# Patient Record
Sex: MALE | Race: BLACK OR AFRICAN AMERICAN | NOT HISPANIC OR LATINO | Employment: FULL TIME | URBAN - METROPOLITAN AREA
[De-identification: names, ages, dates, MRNs, and addresses within clinical notes are randomized per-mention and may not be internally consistent; named-entity substitution may affect disease eponyms.]

---

## 2018-09-16 ENCOUNTER — HOSPITAL ENCOUNTER (INPATIENT)
Facility: HOSPITAL | Age: 43
LOS: 5 days | Discharge: HOME | DRG: 023 | End: 2018-09-21
Attending: EMERGENCY MEDICINE | Admitting: PSYCHIATRY & NEUROLOGY

## 2018-09-16 DIAGNOSIS — Q21.12 PFO (PATENT FORAMEN OVALE): ICD-10-CM

## 2018-09-16 DIAGNOSIS — I49.9 CARDIAC RHYTHM DISORDER OR DISTURBANCE OR CHANGE: ICD-10-CM

## 2018-09-16 DIAGNOSIS — I63.412 CEREBROVASCULAR ACCIDENT (CVA) DUE TO EMBOLISM OF LEFT MIDDLE CEREBRAL ARTERY: Primary | ICD-10-CM

## 2018-09-16 DIAGNOSIS — I63.412 CEREBRAL INFARCTION DUE TO EMBOLISM OF LEFT MIDDLE CEREBRAL ARTERY: ICD-10-CM

## 2018-09-16 DIAGNOSIS — I25.3 PFO WITH ATRIAL SEPTAL ANEURYSM: ICD-10-CM

## 2018-09-16 DIAGNOSIS — Z92.82 STATUS POST ADMINISTRATION OF TPA (RTPA) IN A DIFFERENT FACILITY WITHIN THE LAST 24 HOURS PRIOR TO ADMISSION TO CURRENT FACILITY: ICD-10-CM

## 2018-09-16 DIAGNOSIS — I63.9 STROKE: ICD-10-CM

## 2018-09-16 DIAGNOSIS — Q21.12 PFO WITH ATRIAL SEPTAL ANEURYSM: ICD-10-CM

## 2018-09-16 PROBLEM — G93.6 CYTOTOXIC CEREBRAL EDEMA: Status: ACTIVE | Noted: 2018-09-16

## 2018-09-16 PROBLEM — I10 ESSENTIAL HYPERTENSION: Status: ACTIVE | Noted: 2018-09-16

## 2018-09-16 LAB
ABO + RH BLD: NORMAL
ALBUMIN SERPL BCP-MCNC: 4 G/DL
ALP SERPL-CCNC: 85 U/L
ALT SERPL W/O P-5'-P-CCNC: 34 U/L
AMPHET+METHAMPHET UR QL: NEGATIVE
AMPHET+METHAMPHET UR QL: NEGATIVE
ANION GAP SERPL CALC-SCNC: 10 MMOL/L
AST SERPL-CCNC: 40 U/L
BARBITURATES UR QL SCN>200 NG/ML: NEGATIVE
BARBITURATES UR QL SCN>200 NG/ML: NEGATIVE
BASOPHILS # BLD AUTO: 0.04 K/UL
BASOPHILS NFR BLD: 0.3 %
BENZODIAZ UR QL SCN>200 NG/ML: NORMAL
BENZODIAZ UR QL SCN>200 NG/ML: NORMAL
BILIRUB SERPL-MCNC: 0.6 MG/DL
BLD GP AB SCN CELLS X3 SERPL QL: NORMAL
BUN SERPL-MCNC: 12 MG/DL
BZE UR QL SCN: NEGATIVE
BZE UR QL SCN: NEGATIVE
CALCIUM SERPL-MCNC: 9.6 MG/DL
CANNABINOIDS UR QL SCN: NORMAL
CANNABINOIDS UR QL SCN: NORMAL
CHLORIDE SERPL-SCNC: 105 MMOL/L
CHOLEST SERPL-MCNC: 180 MG/DL
CHOLEST/HDLC SERPL: 3.2 {RATIO}
CK MB SERPL-MCNC: 2.2 NG/ML
CK MB SERPL-RTO: 0.4 %
CK SERPL-CCNC: 591 U/L
CO2 SERPL-SCNC: 25 MMOL/L
CREAT SERPL-MCNC: 1.3 MG/DL
CREAT UR-MCNC: 121 MG/DL
CREAT UR-MCNC: 121 MG/DL
DIASTOLIC DYSFUNCTION: NO
DIFFERENTIAL METHOD: ABNORMAL
EOSINOPHIL # BLD AUTO: 0 K/UL
EOSINOPHIL NFR BLD: 0.3 %
ERYTHROCYTE [DISTWIDTH] IN BLOOD BY AUTOMATED COUNT: 15 %
ERYTHROCYTE [SEDIMENTATION RATE] IN BLOOD BY WESTERGREN METHOD: NORMAL MM/HR
EST. GFR  (AFRICAN AMERICAN): >60 ML/MIN/1.73 M^2
EST. GFR  (NON AFRICAN AMERICAN): >60 ML/MIN/1.73 M^2
ESTIMATED AVG GLUCOSE: 100 MG/DL
ESTIMATED PA SYSTOLIC PRESSURE: 23.43
ETHANOL UR-MCNC: <10 MG/DL
GLUCOSE SERPL-MCNC: 102 MG/DL
HBA1C MFR BLD HPLC: 5.1 %
HCT VFR BLD AUTO: 44.8 %
HDLC SERPL-MCNC: 57 MG/DL
HDLC SERPL: 31.7 %
HGB BLD-MCNC: 14.5 G/DL
IMM GRANULOCYTES # BLD AUTO: 0.07 K/UL
IMM GRANULOCYTES NFR BLD AUTO: 0.5 %
INR PPP: 1.1
LDLC SERPL CALC-MCNC: 107.2 MG/DL
LYMPHOCYTES # BLD AUTO: 1.8 K/UL
LYMPHOCYTES NFR BLD: 13.1 %
MAGNESIUM SERPL-MCNC: 2.3 MG/DL
MCH RBC QN AUTO: 30.9 PG
MCHC RBC AUTO-ENTMCNC: 32.4 G/DL
MCV RBC AUTO: 95 FL
METHADONE UR QL SCN>300 NG/ML: NEGATIVE
METHADONE UR QL SCN>300 NG/ML: NEGATIVE
MONOCYTES # BLD AUTO: 0.7 K/UL
MONOCYTES NFR BLD: 4.9 %
NEUTROPHILS # BLD AUTO: 11.2 K/UL
NEUTROPHILS NFR BLD: 80.9 %
NONHDLC SERPL-MCNC: 123 MG/DL
NRBC BLD-RTO: 0 /100 WBC
OPIATES UR QL SCN: NEGATIVE
OPIATES UR QL SCN: NEGATIVE
PCP UR QL SCN>25 NG/ML: NEGATIVE
PCP UR QL SCN>25 NG/ML: NEGATIVE
PHOSPHATE SERPL-MCNC: 2.6 MG/DL
PLATELET # BLD AUTO: 339 K/UL
PMV BLD AUTO: 10.3 FL
POCT GLUCOSE: 116 MG/DL (ref 70–110)
POCT GLUCOSE: 163 MG/DL (ref 70–110)
POTASSIUM SERPL-SCNC: 4.3 MMOL/L
PROT SERPL-MCNC: 7.6 G/DL
PROTHROMBIN TIME: 11.2 SEC
RBC # BLD AUTO: 4.7 M/UL
RETIRED EF AND QEF - SEE NOTES: 60 (ref 55–65)
SODIUM SERPL-SCNC: 140 MMOL/L
TOXICOLOGY INFORMATION: NORMAL
TOXICOLOGY INFORMATION: NORMAL
TRIGL SERPL-MCNC: 79 MG/DL
TROPONIN I SERPL DL<=0.01 NG/ML-MCNC: <0.006 NG/ML
TROPONIN I SERPL DL<=0.01 NG/ML-MCNC: <0.006 NG/ML
TSH SERPL DL<=0.005 MIU/L-ACNC: 1.47 UIU/ML
WBC # BLD AUTO: 13.82 K/UL

## 2018-09-16 PROCEDURE — 96375 TX/PRO/DX INJ NEW DRUG ADDON: CPT

## 2018-09-16 PROCEDURE — 80307 DRUG TEST PRSMV CHEM ANLYZR: CPT

## 2018-09-16 PROCEDURE — 96376 TX/PRO/DX INJ SAME DRUG ADON: CPT

## 2018-09-16 PROCEDURE — 97162 PT EVAL MOD COMPLEX 30 MIN: CPT

## 2018-09-16 PROCEDURE — 85610 PROTHROMBIN TIME: CPT

## 2018-09-16 PROCEDURE — 84100 ASSAY OF PHOSPHORUS: CPT

## 2018-09-16 PROCEDURE — B3171ZZ FLUOROSCOPY OF LEFT INTERNAL CAROTID ARTERY USING LOW OSMOLAR CONTRAST: ICD-10-PCS | Performed by: PSYCHIATRY & NEUROLOGY

## 2018-09-16 PROCEDURE — 84443 ASSAY THYROID STIM HORMONE: CPT

## 2018-09-16 PROCEDURE — 83735 ASSAY OF MAGNESIUM: CPT

## 2018-09-16 PROCEDURE — 51701 INSERT BLADDER CATHETER: CPT

## 2018-09-16 PROCEDURE — 82550 ASSAY OF CK (CPK): CPT

## 2018-09-16 PROCEDURE — 20000000 HC ICU ROOM

## 2018-09-16 PROCEDURE — 93306 TTE W/DOPPLER COMPLETE: CPT

## 2018-09-16 PROCEDURE — 84484 ASSAY OF TROPONIN QUANT: CPT | Mod: 91

## 2018-09-16 PROCEDURE — 85025 COMPLETE CBC W/AUTO DIFF WBC: CPT

## 2018-09-16 PROCEDURE — 84484 ASSAY OF TROPONIN QUANT: CPT

## 2018-09-16 PROCEDURE — 93010 ELECTROCARDIOGRAM REPORT: CPT | Mod: ,,, | Performed by: INTERNAL MEDICINE

## 2018-09-16 PROCEDURE — 97530 THERAPEUTIC ACTIVITIES: CPT

## 2018-09-16 PROCEDURE — 92610 EVALUATE SWALLOWING FUNCTION: CPT

## 2018-09-16 PROCEDURE — 94761 N-INVAS EAR/PLS OXIMETRY MLT: CPT

## 2018-09-16 PROCEDURE — G8997 SWALLOW GOAL STATUS: HCPCS | Mod: CL

## 2018-09-16 PROCEDURE — 25000003 PHARM REV CODE 250: Performed by: NURSE PRACTITIONER

## 2018-09-16 PROCEDURE — 99223 1ST HOSP IP/OBS HIGH 75: CPT | Mod: ,,, | Performed by: NURSE PRACTITIONER

## 2018-09-16 PROCEDURE — G8996 SWALLOW CURRENT STATUS: HCPCS | Mod: CN

## 2018-09-16 PROCEDURE — G8978 MOBILITY CURRENT STATUS: HCPCS | Mod: CI

## 2018-09-16 PROCEDURE — 83036 HEMOGLOBIN GLYCOSYLATED A1C: CPT

## 2018-09-16 PROCEDURE — 93306 TTE W/DOPPLER COMPLETE: CPT | Mod: 26,,, | Performed by: INTERNAL MEDICINE

## 2018-09-16 PROCEDURE — 82553 CREATINE MB FRACTION: CPT

## 2018-09-16 PROCEDURE — 03CG3ZZ EXTIRPATION OF MATTER FROM INTRACRANIAL ARTERY, PERCUTANEOUS APPROACH: ICD-10-PCS | Performed by: PSYCHIATRY & NEUROLOGY

## 2018-09-16 PROCEDURE — 99285 EMERGENCY DEPT VISIT HI MDM: CPT | Mod: 25

## 2018-09-16 PROCEDURE — 63600175 PHARM REV CODE 636 W HCPCS: Performed by: PHYSICIAN ASSISTANT

## 2018-09-16 PROCEDURE — 80053 COMPREHEN METABOLIC PANEL: CPT

## 2018-09-16 PROCEDURE — 86850 RBC ANTIBODY SCREEN: CPT

## 2018-09-16 PROCEDURE — 96374 THER/PROPH/DIAG INJ IV PUSH: CPT

## 2018-09-16 PROCEDURE — 85652 RBC SED RATE AUTOMATED: CPT

## 2018-09-16 PROCEDURE — 63600175 PHARM REV CODE 636 W HCPCS: Performed by: PSYCHIATRY & NEUROLOGY

## 2018-09-16 PROCEDURE — 99285 EMERGENCY DEPT VISIT HI MDM: CPT | Mod: ,,, | Performed by: PHYSICIAN ASSISTANT

## 2018-09-16 PROCEDURE — B3141ZZ FLUOROSCOPY OF LEFT COMMON CAROTID ARTERY USING LOW OSMOLAR CONTRAST: ICD-10-PCS | Performed by: PSYCHIATRY & NEUROLOGY

## 2018-09-16 PROCEDURE — G8979 MOBILITY GOAL STATUS: HCPCS | Mod: CH

## 2018-09-16 PROCEDURE — 97165 OT EVAL LOW COMPLEX 30 MIN: CPT

## 2018-09-16 PROCEDURE — 80061 LIPID PANEL: CPT

## 2018-09-16 RX ORDER — SODIUM CHLORIDE 9 MG/ML
INJECTION, SOLUTION INTRAVENOUS CONTINUOUS
Status: DISCONTINUED | OUTPATIENT
Start: 2018-09-16 | End: 2018-09-18

## 2018-09-16 RX ORDER — LISINOPRIL 10 MG/1
10 TABLET ORAL DAILY
Status: DISCONTINUED | OUTPATIENT
Start: 2018-09-16 | End: 2018-09-21 | Stop reason: HOSPADM

## 2018-09-16 RX ORDER — ONDANSETRON 2 MG/ML
INJECTION INTRAMUSCULAR; INTRAVENOUS CODE/TRAUMA/SEDATION MEDICATION
Status: COMPLETED | OUTPATIENT
Start: 2018-09-16 | End: 2018-09-16

## 2018-09-16 RX ORDER — ONDANSETRON 2 MG/ML
4 INJECTION INTRAMUSCULAR; INTRAVENOUS EVERY 6 HOURS PRN
Status: DISCONTINUED | OUTPATIENT
Start: 2018-09-16 | End: 2018-09-21 | Stop reason: HOSPADM

## 2018-09-16 RX ORDER — ATORVASTATIN CALCIUM 20 MG/1
40 TABLET, FILM COATED ORAL DAILY
Status: DISCONTINUED | OUTPATIENT
Start: 2018-09-16 | End: 2018-09-20

## 2018-09-16 RX ORDER — NICARDIPINE HYDROCHLORIDE 0.2 MG/ML
1 INJECTION INTRAVENOUS CONTINUOUS
Status: DISCONTINUED | OUTPATIENT
Start: 2018-09-16 | End: 2018-09-17

## 2018-09-16 RX ORDER — MIDAZOLAM HYDROCHLORIDE 1 MG/ML
INJECTION INTRAMUSCULAR; INTRAVENOUS CODE/TRAUMA/SEDATION MEDICATION
Status: COMPLETED | OUTPATIENT
Start: 2018-09-16 | End: 2018-09-16

## 2018-09-16 RX ORDER — LABETALOL HYDROCHLORIDE 5 MG/ML
10 INJECTION, SOLUTION INTRAVENOUS EVERY 4 HOURS PRN
Status: DISCONTINUED | OUTPATIENT
Start: 2018-09-16 | End: 2018-09-17

## 2018-09-16 RX ORDER — SODIUM CHLORIDE 0.9 % (FLUSH) 0.9 %
3 SYRINGE (ML) INJECTION EVERY 8 HOURS
Status: DISCONTINUED | OUTPATIENT
Start: 2018-09-16 | End: 2018-09-21 | Stop reason: HOSPADM

## 2018-09-16 RX ORDER — SODIUM CHLORIDE 0.9 % (FLUSH) 0.9 %
5 SYRINGE (ML) INJECTION
Status: DISCONTINUED | OUTPATIENT
Start: 2018-09-16 | End: 2018-09-21 | Stop reason: HOSPADM

## 2018-09-16 RX ORDER — ACETAMINOPHEN 650 MG/1
650 SUPPOSITORY RECTAL EVERY 6 HOURS PRN
Status: DISCONTINUED | OUTPATIENT
Start: 2018-09-16 | End: 2018-09-17

## 2018-09-16 RX ORDER — ONDANSETRON 2 MG/ML
4 INJECTION INTRAMUSCULAR; INTRAVENOUS
Status: COMPLETED | OUTPATIENT
Start: 2018-09-16 | End: 2018-09-16

## 2018-09-16 RX ADMIN — ONDANSETRON 4 MG: 2 INJECTION INTRAMUSCULAR; INTRAVENOUS at 04:09

## 2018-09-16 RX ADMIN — LABETALOL HYDROCHLORIDE 10 MG: 5 INJECTION, SOLUTION INTRAVENOUS at 03:09

## 2018-09-16 RX ADMIN — SODIUM CHLORIDE: 0.9 INJECTION, SOLUTION INTRAVENOUS at 06:09

## 2018-09-16 RX ADMIN — MIDAZOLAM HYDROCHLORIDE 1 MG: 1 INJECTION, SOLUTION INTRAMUSCULAR; INTRAVENOUS at 04:09

## 2018-09-16 RX ADMIN — NICARDIPINE HYDROCHLORIDE 2.5 MG/HR: 0.2 INJECTION, SOLUTION INTRAVENOUS at 05:09

## 2018-09-16 RX ADMIN — NICARDIPINE HYDROCHLORIDE 5.5 MG/HR: 0.2 INJECTION, SOLUTION INTRAVENOUS at 03:09

## 2018-09-16 NOTE — HOSPITAL COURSE
06/16: Admit to [facility], Ir angio/Thrombectomy, LMI TICI3  Exam with significant improvement, aphasia and R drift resolved.   MRI today with L BG acute infarcts as well as small regions of temporal and parietal lobe infarcts on left, embolic likely etiology. Echo results without abnormality. No hemorrhagic convervsion. ASA started and will ttf today.

## 2018-09-16 NOTE — ASSESSMENT & PLAN NOTE
-Area of cytotoxic cerebral edema identified when reviewing brain imaging in the territory of the left middle cerebral artery. There is no mass effect associated with it. We will continue to monitor the patients clinical exam for any worsening of symptoms which may indicate expansion of the stroke or the area of the edema resulting in the clinical change. The pattern is suggestive of embolic etiology

## 2018-09-16 NOTE — ASSESSMENT & PLAN NOTE
-Stroke risk factor.  Patient without previous diagnosis or treatment.  Initial SBP at .  SBP on arrival to this facility 170s.  -SBP<140

## 2018-09-16 NOTE — ASSESSMENT & PLAN NOTE
-- Continue to monitor HR and BP   -- SBP goal < 140  -- 2D echo pending  -- Labetalol Prn  -- Ekg pending

## 2018-09-16 NOTE — ASSESSMENT & PLAN NOTE
43 y.o. male with no significant past medical history presented to hospital as a transfer from [facility] for evaluation of L MCA stroke.  tPA administered with some improvement of symptoms but remains with weakness, aphasia, dysarthria.  Taken to IR for cerebral angiogram.      Antithrombotics for secondary stroke prevention: Antiplatelets: None: Hold all Antithrombotics x 24 hours after IV t-PA administration; Start ASA 81 mg 24 hrs post tPA administration.    Statins for secondary stroke prevention and hyperlipidemia, if present:   Statins: Atorvastatin- 40 mg daily    Aggressive risk factor modification: None     Rehab efforts: PT/OT/SLP to evaluate and treat    Diagnostics ordered/pending: HgbA1C to assess blood glucose levels, Lipid Profile to assess cholesterol levels, MRI head without contrast to assess brain parenchyma, TTE to assess cardiac function/status-Recommend bubble study due to patient's age/lack of known risk factors, TSH to assess thyroid function    VTE prophylaxis: None: Reason for No Pharmacological VTE Prophylaxis: Mechanical prophylaxis: Place SCDs, hold pharmacologic prophylaxis until 24 hrs post tPA    BP parameters: Infarct: Post sucessful thrombectomy, SBP <140

## 2018-09-16 NOTE — PROGRESS NOTES
Cerebral angiogram complete. Hemostasis achieved via right groin with use of 8 FR. Angioseal closure device at 0510 by MD Fernando. HOB to remain flat and right leg straight until 0710. No acute events. See flowsheet for further monitoring.

## 2018-09-16 NOTE — SUBJECTIVE & OBJECTIVE
No past medical history on file.  No past surgical history on file.   No current facility-administered medications on file prior to encounter.      No current outpatient medications on file prior to encounter.      Allergies: Patient has no known allergies.    No family history on file.  Social History     Tobacco Use    Smoking status: Not on file   Substance Use Topics    Alcohol use: Not on file    Drug use: Not on file     Review of Systems   Unable to assess due to expressive aphasia  Objective:     Vitals:    Pulse: 76  Rhythm: normal sinus rhythm  BP: (!) 156/79  Resp: (!) 23  SpO2: (!) 94 %  O2 Device (Oxygen Therapy): room air    Pulse  Min: 74  Max: 94  BP  Min: 129/68  Max: 172/93  Resp  Min: 11  Max: 23  SpO2  Min: 94 %  Max: 99 %    No intake/output data recorded.           Physical Exam  GA: Alert, comfortable, no acute distress.   HEENT: No scleral icterus or JVD.   Pulmonary: Clear to auscultation A/P/L. No wheezing, crackles, or rhonchi.  Cardiac: RRR S1 & S2 w/o rubs/murmurs/gallops.   Abdominal: Bowel sounds present x 4. No appreciable hepatosplenomegaly.  Skin: No jaundice, rashes, or visible lesions.  Neuro:  --GCS: E4 V1 M6  --Mental Status:  AA, following commands in all exts, weak in the RUE, expressive aphasia   --CN II-XII grossly intact.   --Pupils 5mm, PERRL.   --Corneal reflex, gag, cough intact.  --LUE strength: 5/5  --LLE strength: 5/5  --RUE strength: 3/5  --RLE strength: 5/5      Today I personally reviewed pertinent medications, lines/drains/airways, imaging, cardiology, lab results, microbiology results, notably:

## 2018-09-16 NOTE — PLAN OF CARE
Problem: Occupational Therapy Goal  Goal: Occupational Therapy Goal  OT evaluation completed.  ANDREW Crump  9/16/2018

## 2018-09-16 NOTE — PT/OT/SLP EVAL
"Occupational Therapy   Evaluation    Name: Flakito Felder  MRN: [id number]  Admitting Diagnosis:  Cerebrovascular accident (CVA) due to embolism of left middle cerebral artery      Recommendations:     Discharge Recommendations:  outpt OT)  Discharge Equipment Recommendations:  none  Barriers to discharge:  None    History:     Occupational Profile:  Patient resides in [place], [place] with girlfriend in first floor apt with no steps to enter.  Patient is right handed.  PTA patient independent with ADLs, including driving.  Currently owns no DME.  Works: concrete work.  Hobbies: being outside, exercising, cooking.  Roles/Responsibilities:  Father, son, boyfriend.     No past medical history on file.    No past surgical history on file.    Subjective     Patient:  Nonverbal.  Communicating with "yes" head nods to all questions.  Girlfriend:  "When I first took him to the hospital, he couldn't move his entire right side.  Now he is moving everything.  He is a lot better."  Pain/Comfort:  · Pain Rating 1: 0/10  · Pain Rating Post-Intervention 1: 0/10    Patients cultural, spiritual, Taoism conflicts given the current situation: Pentec[employer]al    Objective:     Communicated with: Nurse prior to session.  Patient found all lines intact, call button in reach and bed alarm on and blood pressure cuff, telemetry, peripheral IV, Condom Catheter, oxygen upon OT entry to room.  Patient's mother and girlfriend present.  General Precautions: Standard, aspiration, fall, NPO   Orthopedic Precautions:N/A   Braces: N/A     Occupational Performance:    Bed Mobility:    · Patient completed Rolling/Turning to Right with supervision  · Patient completed Scooting/Bridging with supervision  · Patient completed Supine to Sit with supervision  · Patient completed Sit to Supine with supervision    Functional Mobility/Transfers:  · Patient completed Sit <> Stand Transfer with contact guard assistance  with  no assistive device   · Patient completed Bed " <> Chair Transfer using Stand Pivot technique with contact guard assistance with no assistive device    Activities of Daily Living:  · Grooming: minimum assistance while standing with assistance for right UE coordination  · Upper Body Dressing: minimum assistance with assistance for fasteners  · Lower Body Dressing: minimum assistance with assistance for fastener    Cognitive/Visual Perceptual:  Cognitive/Psychosocial Skills:     -       Oriented to: unable to verbalize name.  Daily orientation provided   -       Follows Commands/attention:followed 2/4 one step verbal commands  -       Communication: Nonverbal  -       Safety awareness/insight to disability: impaired     Physical Exam:  Postural examination/scapula alignment:    -       Rounded shoulders  Skin integrity: Visible skin intact  Edema:  None noted  Sensation: responsive to tactile stimulation on right  Upper Extremity Range of Motion:  AROM WNL  Upper Extremity Strength:R UE:  3/5    AMPAC 6 Click ADL:  AMPAC Total Score: 16    Treatment & Education:  Patient/ Family education provided for stroke warning signs, prevention guidelines and personal risk factors.  Patient/ Family verbalizing understanding via teach back method.  Patient education provided on role of OT and need for continued OT upon discharge.  Patient education provided on hemiplegic dressing technique, right UE weight bearing / positioning, postural control, and transfers.    Continued education, patient/ family training recommended.  Patient alert and attentive.  Unable to verbalize name.  Able to follow 2/4 one step commands.  Daily orientation provided.  AROM performed bilateral UE/LEs one set x 10 rep in all planes of motion with stretches provided at end range.  Family present during the session.  White board updated in patient's room.  OT asked if there were any other questions; patient/ family had no further questions.   Education:    Patient left supine with all lines intact, call  "button in reach and bed alarm on    Assessment:     Flakito Felder is a 43 y.o. male with a medical diagnosis of Cerebrovascular accident (CVA) due to embolism of left middle cerebral artery.  He presents with the following performance deficits affecting function: weakness, gait instability, impaired functional mobilty, impaired self care skills, impaired balance, impaired cognition, decreased upper extremity function, decreased lower extremity function, impaired fine motor.  These performance deficits have resulted in activity limitations including but not limited to: bed mobility, transfers, ascending/ descending stairs, walking short and long distances, walking around obstacles, transitional movement patterns (kneeling, bending); eating, upper body dressing, lower body dressing, brushing teeth, toileting, bathing, carrying objects, meal preparation and driving.   Patient's role as boyfriend, father, son and independent caretaker for self has been affected. Patient will benefit from skilled OT services to maximize level of independence with self-care skills and functional mobility.      Rehab Prognosis: Good; patient would benefit from acute skilled OT services to address these deficits and reach maximum level of function.         Clinical Decision Makin.  OT Low:  "Pt evaluation falls under low complexity for evaluation coding due to performance deficits noted in 1-3 areas as stated above and 0 co-morbities affecting current functional status. Data obtained from problem focused assessments. No modifications or assistance was required for completion of evaluation. Only brief occupational profile and history review completed."     Plan:     Patient to be seen 4 x/week to address the above listed problems via self-care/home management, neuromuscular re-education, cognitive retraining, therapeutic activities, therapeutic exercises, sensory integration  · Plan of Care Expires: 10/15/18  · Plan of Care Reviewed " with:  patient, nurse    This Plan of care has been discussed with the patient who was involved in its development and understands and is in agreement with the identified goals and treatment plan    GOALS:   Multidisciplinary Problems     Occupational Therapy Goals        Problem: Occupational Therapy Goal    Goal Priority Disciplines Outcome Interventions   Occupational Therapy Goal     OT, PT/OT     Description:  Goals set 9/16 to be addressed for 7 days with expiration date, 9/23:  Patient will increase functional independence with ADLs by performing:    Patient will demonstrate rolling to the right with modified independence.  Not met   Patient will demonstrate rolling to the left with modified independence.   Not met  Patient will demonstrate supine -sit with modified independence.   Not met  Patient will demonstrate stand pivot transfers with supervision.   Not met  Patient will demonstrate grooming while standing with supervision.   Not met  Patient will demonstrate upper body dressing with supervision while seated EOB.   Not met  Patient will demonstrate lower body dressing with supervision while seated EOB.   Not met  Patient will demonstrate toileting with supervision.   Not met  Patient will demonstrate bathing while seated EOB with supervision.   Not met  Patient will demonstrate ability to follow 5/5 commands.   Not met  Patient will demonstrate independence with HEP for FMC right UE.    Not met  Patient's family / caregiver will demonstrate independence and safety with assisting patient with self-care skills and functional mobility.     Not met  Patient and/or patient's family will verbalize understanding of stroke prevention guidelines, personal risk factors and stroke warning signs via teachback method.  Not met                           Time Tracking:     OT Date of Treatment: 09/16/18  OT Start Time: 0815  OT Stop Time: 0845  OT Total Time (min): 30 min    Billable Minutes:Evaluation  20  Therapeutic Activity 10    Neva Friedman, LOTR  9/16/2018

## 2018-09-16 NOTE — HPI
"Patient is a 43 y.o. male with no significant past medical history presented to hospital as a transfer from [facility] for evaluation of L MCA stroke.  HPI information obtained via review of the patient's record due to his condition, no family at the bedside.  Patient was LKN ~2300.  The patient had been out with friends drinking and returned home ~2200 and was talking with his girlfriend and began to act "strange" after speaking with her for 10-15 min.  He bent over and was off balance which his girlfriend attributed to his ETOH consumption.  The patient went to bed ~2300 and woke up ~2330 attempting to get out of bed but had acutely developed aphasia and right hemiparesis. The patient was taken to the OSH ED where a CTH was obtained and was negative for acute findings.  tPA was administered.  Telestroke consult performed by Dr. Mix.  Transferred to [facility] for further evaluation/possible intervention.  On arrival patient is aphasic, dysarthric, appears to be having difficulty with his oral secretions, and has Right sided weakness.  Taken to CT for CTH.  No evidence of hemorrhage.  Patient to IR for cerebral angio, possible thrombectomy.  Will be admitted to [facility] for close monitoring post procedure and tPA.  Vascular Neurology will continue to follow.    "

## 2018-09-16 NOTE — ASSESSMENT & PLAN NOTE
S/p tPA and Thrombectomy   -- Continue Neuro checks q 1hr  -- Vascular Neurology consulted  -- SBP goal < 140  -- PT/OT/Speech  -- CXR pending  -- 2D echo pending  -- 09/16: CTH revealed No acute intracranial abnormality. Specifically, no hemorrhage or acute major vascular distribution infarct.  -- 09/16: IR angio: L M1 occlusion, treated successfully w thrombectomy to TICI 3  -- Mri ordered at 8pm

## 2018-09-16 NOTE — SUBJECTIVE & OBJECTIVE
No past medical history on file.  No past surgical history on file.  No family history on file.  Social History     Tobacco Use    Smoking status: Not on file   Substance Use Topics    Alcohol use: Not on file    Drug use: Not on file     Review of patient's allergies indicates:  No Known Allergies    Medications: I have reviewed the current medication administration record.    No current facility-administered medications for this encounter.   No current outpatient medications on file.      Review of Systems   Constitutional: Negative for chills and fever.   HENT: Positive for trouble swallowing. Negative for drooling.    Eyes: Negative for discharge, redness and visual disturbance.   Respiratory: Negative for cough and shortness of breath.    Cardiovascular: Negative for chest pain, palpitations and leg swelling.   Gastrointestinal: Positive for nausea. Negative for abdominal pain, diarrhea and vomiting.   Endocrine: Negative for cold intolerance and heat intolerance.   Genitourinary: Negative for hematuria.   Musculoskeletal: Negative for neck pain and neck stiffness.   Skin: Negative for rash.   Allergic/Immunologic: Negative for environmental allergies and food allergies.   Neurological: Positive for speech difficulty and weakness. Negative for dizziness and headaches.   Psychiatric/Behavioral: Negative for agitation.     Objective:     Vital Signs (Most Recent):  Pulse: 80 (09/16/18 0443)  Resp: 16 (09/16/18 0443)  BP: (!) 172/93 (09/16/18 0443)  SpO2: 95 % (09/16/18 0443)    Vital Signs Range (Last 24H):  Pulse:  [80-82]   Resp:  [16-18]   BP: (129-172)/(68-93)   SpO2:  [95 %-98 %]     Physical Exam   Constitutional: He is oriented to person, place, and time. He appears well-developed and well-nourished. No distress.   HENT:   Head: Normocephalic and atraumatic.   Right Ear: External ear normal.   Left Ear: External ear normal.   Nose: Nose normal.   Mouth/Throat: Oropharynx is clear and moist. No  oropharyngeal exudate.   Eyes: Conjunctivae and EOM are normal. Pupils are equal, round, and reactive to light. Right eye exhibits no discharge. Left eye exhibits no discharge. No scleral icterus.   Neck: Normal range of motion. Neck supple. No thyromegaly present.   Cardiovascular: Normal rate, regular rhythm, normal heart sounds and intact distal pulses.   No murmur heard.  Pulmonary/Chest: Effort normal and breath sounds normal. No respiratory distress. He has no wheezes.   Abdominal: Soft. Bowel sounds are normal. He exhibits no distension. There is no tenderness.   Musculoskeletal: He exhibits no edema.   Lymphadenopathy:     He has no cervical adenopathy.   Neurological: He is alert and oriented to person, place, and time.   Skin: Skin is warm and dry. He is not diaphoretic.   Psychiatric: He has a normal mood and affect.   Nursing note and vitals reviewed.      Neurological Exam:   LOC: alert  Attention Span: Good   Language: Expressive aphasia  Articulation: Dysarthria  Visual Fields: Full  EOM (CN III, IV, VI): Full/intact  Pupils (CN II, III): PERRL  Motor: Arm left  Normal 5/5  Leg left  Normal 5/5  Arm right  Paresis: 4/5  Leg right Paresis: 4/5  Sensation: Intact to light touch, temperature and vibration      Laboratory:  CMP: No results for input(s): GLUCOSE, CALCIUM, ALBUMIN, PROT, NA, K, CO2, CL, BUN, CREATININE, ALKPHOS, ALT, AST, BILITOT in the last 24 hours.  CBC: No results for input(s): WBC, RBC, HGB, HCT, PLT, MCV, MCH, MCHC in the last 168 hours.  Lipid Panel: No results for input(s): CHOL, LDLCALC, HDL, TRIG in the last 168 hours.  Coagulation: No results for input(s): PT, INR, APTT in the last 168 hours.  Hgb A1C: No results for input(s): HGBA1C in the last 168 hours.  TSH: No results for input(s): TSH in the last 168 hours.    Diagnostic Results:      Brain imaging:  [facility] 09/16/2018 obtained at OSH, negative for acute findings    MRI Brain pending    Vessel Imaging:  [facility] Head [facility] Neck  09/16/2018 obtained at OSH.  Occlusion of the proximal left M2 segment of the MCA.    Cerebral Angiogram in progress    Cardiac Evaluation:   2D Echo pending

## 2018-09-16 NOTE — HPI
Mr. Felder is a 43 year old male with no known pmhx who presents to [facility] for a higher level of care for LMCA s/p TPA and Thrombectomy. The patient was transferred from [facility] where he initially presented with right  sided weakness and aphasic at 10:00PM yesterday. TPA end time was 0151.

## 2018-09-16 NOTE — PROCEDURES
Radiology Post-Procedure Note    Pre Op Diagnosis: L MCA occlusion    Post Op Diagnosis: TICI 3 L MCA    Procedure: Cerebral angiogram and aspiration thrombectomy    Procedure performed by: Titi King MD    Written Informed Consent Obtained: Yes    Specimen Removed: YES clot    Estimated Blood Loss: less than 100     Procedure report:     A 8F sheath was placed into the right femoral artery and a 5F Bruno catheter was advanced into the aortic arch.  The Left internal carotid arteries were subselected and angiography of the brain was performed after injection into each of these vessels.    Preliminary interpretation: L M1 occlusion, treated successfully w thrombectomy to TICI 3.  Please see Imaging report for full details.    A right femoral artery angiogram was performed, the sheath removed and hemostasis achieved using angioseal.  No hematoma was present at the time of hemostasis.    The patient tolerated the procedure well.     Titi King MD  Vascular and Interventional Neurology Staff  Director of [facility]  [facility]  [phone]

## 2018-09-16 NOTE — PT/OT/SLP EVAL
Speech Language Pathology Evaluation  Bedside Swallow    Patient Name:  Flakito Felder   MRN:  [id number]  Admitting Diagnosis: Cerebrovascular accident (CVA) due to embolism of left middle cerebral artery    Recommendations:                 General Recommendations:  Dysphagia therapy with ongoing swallow assessment and Speech language evaluation  Diet recommendations:  NPO, NPO   Aspiration Precautions: Strict aspiration precautions   General Precautions: Standard, aspiration, aphasia, fall, NPO, Aphasic    History:     No past medical history on file.    No past surgical history on file.    Prior diet: regular/thin    Subjective   Awake, aphasic. Significant other & mother at bedside.     Pain/Comfort:  · Pain Rating 1: (pt nodded indicating yes, unable to communicate rating or specify location)  · Pain Rating Post-Intervention 2: (same)    Objective:     Oral Musculature Evaluation  · Dentition: present and adequate  · Oral Labial Strength and Mobility: impaired retraction, impaired pursing  · Lingual Strength and Mobility: impaired protrusion, impaired right lateral movement, impaired left lateral movement, impaired anterior elevation  · Volitional Cough: attempted, weak  · Volitional Swallow: adequate  · Voice Prior to PO Intake: no voicing elicited    Bedside Swallow Eval:   Consistencies Assessed:  · Thin liquids ice chip x1, via spoon x1  · Puree 1 tsp x1     Oral Phase:   · WFL    Pharyngeal Phase:   · coughing  · multiple spontaneous swallows    Assessment:     Flakito Felder is a 43 y.o. male with an SLP diagnosis of Dysphagia.  He presents with s/s pharyngeal dysphagia & s/s aspiration    Goals:   Multidisciplinary Problems     SLP Goals        Problem: SLP Goal    Goal Priority Disciplines Outcome   SLP Goal     SLP    Description:  Speech Language Pathology Goals  Goals expected to be met by 9/23  1. Ongoing swallow assessment  2. SLP Speech/Language/Cognitive Evaluation                    Plan:     · Patient to  be seen:  5 x/week   · Plan of Care expires:  10/15/18  · Plan of Care reviewed with:  patient, mother, significant other   · SLP Follow-Up:  Yes       Discharge recommendations:  (tbd)     Time Tracking:     SLP Treatment Date:   09/16/18  Speech Start Time:  1347  Speech Stop Time:  1400     Speech Total Time (min):  13 min    Billable Minutes: Eval Swallow and Oral Function 13    Lilly Ulloa CCC-SLP  09/16/2018

## 2018-09-16 NOTE — CONSULTS
[facility]  Vascular Neurology  Comprehensive Stroke Center  Consult Note    Inpatient consult to Vascular Neurology  Consult performed by: Estefania Ramirez DNP, NP  Consult ordered by: Crystal Stewart PA-C    Inpatient consult to Vascular (Stroke) Neurology  Consult performed by: Estefania Ramirez DNP, NP  Consult ordered by: Clay Pacheco NP  Reason for consult: transfer, post tPA, L MCA stroke        Assessment/Plan:     S/P admn tPA in diff fac w/n last 24 hr bef adm to crnt fac    -Admitted to [facility] for close monitoring.        Cytotoxic cerebral edema    -Area of cytotoxic cerebral edema identified when reviewing brain imaging in the territory of the left middle cerebral artery. There is no mass effect associated with it. We will continue to monitor the patients clinical exam for any worsening of symptoms which may indicate expansion of the stroke or the area of the edema resulting in the clinical change. The pattern is suggestive of embolic etiology            Embolic stroke involving left middle cerebral artery    43 y.o. male with no significant past medical history presented to hospital as a transfer from [facility] for evaluation of L MCA stroke.  tPA administered with some improvement of symptoms but remains with weakness, aphasia, dysarthria.  Taken to IR for cerebral angiogram.      Antithrombotics for secondary stroke prevention: Antiplatelets: None: Hold all Antithrombotics x 24 hours after IV t-PA administration; Start ASA 81 mg 24 hrs post tPA administration.    Statins for secondary stroke prevention and hyperlipidemia, if present:   Statins: Atorvastatin- 40 mg daily    Aggressive risk factor modification: None     Rehab efforts: PT/OT/SLP to evaluate and treat    Diagnostics ordered/pending: HgbA1C to assess blood glucose levels, Lipid Profile to assess cholesterol levels, MRI head without contrast to assess brain parenchyma, TTE to assess cardiac  function/status-Recommend bubble study due to patient's age/lack of known risk factors, TSH to assess thyroid function    VTE prophylaxis: None: Reason for No Pharmacological VTE Prophylaxis: Mechanical prophylaxis: Place SCDs, hold pharmacologic prophylaxis until 24 hrs post tPA    BP parameters: Infarct: Post sucessful thrombectomy, SBP <140            Essential hypertension    -Stroke risk factor.  Patient without previous diagnosis or treatment.  Initial SBP at .  SBP on arrival to this facility 170s.  -SBP<140              STROKE DOCUMENTATION     Acute Stroke Times   Last Known Normal Date: 09/15/18  Last Known Normal Time: 2300  Symptom Onset Date: 09/15/18  Symptom Onset Time: 2330  Stroke Team Called Date: 09/16/18  Stroke Team Called Time: 0125  Stroke Team Arrival Date: 09/16/18  Stroke Team Arrival Time: 0130  CT Interpretation Time: 0039(no acute abnormality)  Decision to Treat Time for Alteplase: 0040(administered prior to telestroke consult)  Decision to Treat Time for IR: 0414(taken to IR for possible thrombectomy)    NIH Scale:  Interval: baseline (upon arrival/admit)  1a. Level Of Consciousness: 0-->Alert: keenly responsive  1b. LOC Questions: 0-->Answers both questions correctly  1c. LOC Commands: 0-->Performs both tasks correctly  2. Best Gaze: 0-->Normal  3. Visual: 0-->No visual loss  4. Facial Palsy: 0-->Normal symmetrical movements  5a. Motor Arm, Left: 0-->No drift: limb holds 90 (or 45) degrees for full 10 secs  5b. Motor Arm, Right: 2-->Some effort against gravity: limb cannot get to or maintain (if cued) 90 (or 45) degrees, drifts down to bed, but has some effort against gravity  6a. Motor Leg, Left: 0-->No drift: leg holds 30 degree position for full 5 secs  6b. Motor Leg, Right: 1-->Drift: leg falls by the end of the 5-sec period but does not hit bed  7. Limb Ataxia: 0-->Absent  8. Sensory: 0-->Normal: no sensory loss  9. Best Language: 2-->Severe aphasia: all communication is  "through fragmentary expression: great need for inference, questioning, and guessing by the listener. Range of information that can be exchanged is limited: listener carries burden of. . . (see row details)  10. Dysarthria: 2-->Severe dysarthria: patients speech is so slurred as to be unintelligible in the absence of or out of proportion to any dysphasia, or is mute/anarthric  11. Extinction and Inattention (formerly Neglect): 0-->No abnormality  Total (NIH Stroke Scale): 7    Modified Bob Score: 0  Fabio Coma Scale:15   ABCD2 Score:    [id number] Score:   HAS -BLED Score:   ICH Score:   Hunt & Hernandez Classification:       Thrombolysis Candidate? Yes, given prior to arrival at outside hospital      Interventional Revascularization Candidate?   Is the patient eligible for mechanical endovascular reperfusion (SAI)?  Yes; TICI 3     Hemorrhagic change of an Ischemic Stroke: Does this patient have an ischemic stroke with hemorrhagic changes? No     Subjective:     History of Present Illness:  Patient is a 43 y.o. male with no significant past medical history presented to hospital as a transfer from [facility] for evaluation of L MCA stroke.  HPI information obtained via review of the patient's record due to his condition, no family at the bedside.  Patient was LKN ~2300.  The patient had been out with friends drinking and returned home ~2200 and was talking with his girlfriend and began to act "strange" after speaking with her for 10-15 min.  He bent over and was off balance which his girlfriend attributed to his ETOH consumption.  The patient went to bed ~2300 and woke up ~2330 attempting to get out of bed but had acutely developed aphasia and right hemiparesis. The patient was taken to the [facility] ED where a CTH was obtained and was negative for acute findings.  tPA was administered.  Telestroke consult performed by Dr. Mix.  Transferred to [facility] for further evaluation/possible " intervention.  On arrival patient is aphasic, dysarthric, appears to be having difficulty with his oral secretions, and has Right sided weakness.  Taken to CT for CTH.  No evidence of hemorrhage.  Patient to IR for cerebral angio, possible thrombectomy.  Will be admitted to [facility] for close monitoring post procedure and tPA.  Vascular Neurology will continue to follow.          No past medical history on file.  No past surgical history on file.  No family history on file.  Social History     Tobacco Use    Smoking status: Not on file   Substance Use Topics    Alcohol use: Not on file    Drug use: Not on file     Review of patient's allergies indicates:  No Known Allergies    Medications: I have reviewed the current medication administration record.    No current facility-administered medications for this encounter.   No current outpatient medications on file.      Review of Systems   Constitutional: Negative for chills and fever.   HENT: Positive for trouble swallowing. Negative for drooling.    Eyes: Negative for discharge, redness and visual disturbance.   Respiratory: Negative for cough and shortness of breath.    Cardiovascular: Negative for chest pain, palpitations and leg swelling.   Gastrointestinal: Positive for nausea. Negative for abdominal pain, diarrhea and vomiting.   Endocrine: Negative for cold intolerance and heat intolerance.   Genitourinary: Negative for hematuria.   Musculoskeletal: Negative for neck pain and neck stiffness.   Skin: Negative for rash.   Allergic/Immunologic: Negative for environmental allergies and food allergies.   Neurological: Positive for speech difficulty and weakness. Negative for dizziness and headaches.   Psychiatric/Behavioral: Negative for agitation.     Objective:     Vital Signs (Most Recent):  Pulse: 80 (09/16/18 0443)  Resp: 16 (09/16/18 0443)  BP: (!) 172/93 (09/16/18 0443)  SpO2: 95 % (09/16/18 0443)    Vital Signs Range (Last 24H):  Pulse:  [80-82]   Resp:   [16-18]   BP: (129-172)/(68-93)   SpO2:  [95 %-98 %]     Physical Exam   Constitutional: He is oriented to person, place, and time. He appears well-developed and well-nourished. No distress.   HENT:   Head: Normocephalic and atraumatic.   Right Ear: External ear normal.   Left Ear: External ear normal.   Nose: Nose normal.   Mouth/Throat: Oropharynx is clear and moist. No oropharyngeal exudate.   Eyes: Conjunctivae and EOM are normal. Pupils are equal, round, and reactive to light. Right eye exhibits no discharge. Left eye exhibits no discharge. No scleral icterus.   Neck: Normal range of motion. Neck supple. No thyromegaly present.   Cardiovascular: Normal rate, regular rhythm, normal heart sounds and intact distal pulses.   No murmur heard.  Pulmonary/Chest: Effort normal and breath sounds normal. No respiratory distress. He has no wheezes.   Abdominal: Soft. Bowel sounds are normal. He exhibits no distension. There is no tenderness.   Musculoskeletal: He exhibits no edema.   Lymphadenopathy:     He has no cervical adenopathy.   Neurological: He is alert and oriented to person, place, and time.   Skin: Skin is warm and dry. He is not diaphoretic.   Psychiatric: He has a normal mood and affect.   Nursing note and vitals reviewed.      Neurological Exam:   LOC: alert  Attention Span: Good   Language: Expressive aphasia  Articulation: Dysarthria  Visual Fields: Full  EOM (CN III, IV, VI): Full/intact  Pupils (CN II, III): PERRL  Motor: Arm left  Normal 5/5  Leg left  Normal 5/5  Arm right  Paresis: 4/5  Leg right Paresis: 4/5  Sensation: Intact to light touch, temperature and vibration      Laboratory:  CMP: No results for input(s): GLUCOSE, CALCIUM, ALBUMIN, PROT, NA, K, CO2, CL, BUN, CREATININE, ALKPHOS, ALT, AST, BILITOT in the last 24 hours.  CBC: No results for input(s): WBC, RBC, HGB, HCT, PLT, MCV, MCH, MCHC in the last 168 hours.  Lipid Panel: No results for input(s): CHOL, LDLCALC, HDL, TRIG in the last 168  hours.  Coagulation: No results for input(s): PT, INR, APTT in the last 168 hours.  Hgb A1C: No results for input(s): HGBA1C in the last 168 hours.  TSH: No results for input(s): TSH in the last 168 hours.    Diagnostic Results:      Brain imaging:  CTH 09/16/2018 obtained at OSH, negative for acute findings    MRI Brain pending    Vessel Imaging:  CTA Head and Neck 09/16/2018 obtained at OSH.  Occlusion of the proximal left M2 segment of the MCA.    Cerebral Angiogram in progress    Cardiac Evaluation:   2D Echo pending      Estefania Ramirez, EM, NP  Comprehensive Stroke Center  Department of Vascular Neurology   [facility]

## 2018-09-16 NOTE — PT/OT/SLP EVAL
"Physical Therapy Evaluation    Patient Name:  Flakito Felder   MRN:  [id number]  Admitting Diagnosis:  Cerebrovascular accident (CVA) due to embolism of left middle cerebral artery   S/p tPA & thrombectomy  Recent Surgery: * No surgery found *      Recommendations:     Discharge Recommendations:  outpatient PT(will require 24hr supervision due to expressive aphasia)   Discharge Equipment Recommendations:     Barriers to discharge: Decreased caregiver support    Plan:     During this hospitalization, patient to be seen 4 x/week to address the above listed problems via therapeutic activities, gait training, therapeutic exercises, neuromuscular re-education  · Plan of Care Expires:  10/15/18   Plan of Care Reviewed with: patient, mother, significant other    This Plan of care has been discussed with the patient who was involved in its development and understands and is in agreement with the identified goals and treatment plan    History:   Pt unable to provide history due to aphasia, Pt's mother and girlfriend provide history.  Patient lives with their significant other in [place], [place] in a 1st floor apartment with no ALEXIS.  Patient reports being independent with mobility & with ADLs.  Patient uses DME as follows: none.    DME owned (not currently used): none.  Hand Dominance: right    Roles/Repsonsibilities:   Work: yes, .    Drive: yes.    Managing Medicines/Managing Home: yes.    Hobbies: working out.  Upon discharge, patient will have assistance from family.    No past medical history on file.    No past surgical history on file.    Subjective     Communicated with RN prior to session.  Patient found supine upon PT entry to room, agreeable to evaluation.  Pt's mother and girlfriend present throughout session.  Pt nodding head "yes" to all questions. No vocalizations observed.  Chief Complaint: aphasia  Patient comments/goals: to return home  Pain/Comfort:  · Pain Rating 1: 0/10  · Pain Rating " Post-Intervention 1: 0/10    Objective:     Patient found with: blood pressure cuff, pulse ox (continuous), telemetry, Condom Catheter, peripheral IV     General Precautions: Standard, Cardiac aspiration, fall, NPO   Orthopedic Precautions:N/A   Braces: N/A   Respiratory Status: room air  Vital Signs (Most Recent):    Temp: 98.4 °F (36.9 °C) (09/16/18 1100)  Pulse: 73 (09/16/18 1100)  Resp: 10 (09/16/18 1100)  BP: 109/66 (09/16/18 1100)  SpO2: (!) 93 % (09/16/18 1100)    Exam:  · Mental Status: REGI due to expressive aphasia and follows 100% of verbal, single-step commands. Pt is Alert and Cooperative during session.  · Skin Integrity: Visible skin intact  · Edema: none noted  · Sensation: Intact  · Hearing: Intact  · Vision:  Intact  · Coordination: NT  · Range of Motion:  · RUE: WFL  · LUE: WFL  · RLE: WFL  · LLE: WFL  · Strength Exam:  · Lower Extremity Strength  (R) LE  (L) LE    Hip Flexion: 5/5 Hip flexion: 5/5   Knee flexion: 5/5 Knee flexion: 5/5   Knee extension: 5/5 Knee extension 5/5   Ankle dorsiflexion: 5/5 Ankle dorsiflexion: 5/5   Ankle plantarflexion: 5/5 Ankle plantarflexion: 5/5     Outcome Measures:   TINETTI BALANCE ASSESSMENT TOOL    Bernyetti ME, Miki TF, Mayleilaniki R, Fall Risk Index for elderly patients based on number of chronic disabilities.Am J Med [id number]    Assistive Device  Normally Used: No  Assistive Device During Testing: No     BALANCE SECTION  Patient is seated in hard, armless chair;    1.Sitting Balance:   Steady; safe = 1  2.Rises from chair :  Able, without using arms = 2  3.Attempts to arise :  Able to arise, 1 attempt = 2  4.Immediate standing Balance (first 5 seconds) : Steady without walker or other support = 2  5.Standing balance: Narrow stance without support = 2  6.Nudged : Steady = 2  7.Eyes closed: Steady = 1  8.Turning 360 degrees:  Discontinuous steps = 0 and Steady = 1  9.Sitting Down : Safe, smooth motion = 2    Balance Score:  15/16    GAIT SECTION  Patient  stands with therapist, walks across room (+/- aids), first at usual pace, then at rapid pace.    10.Initiation of Gait (Immediately after told to go.): No hesitancy = 1  11.Step length and height & Foot Clearance: Right swing foot:  Passes left stance foot = 1 and Completely clears floor with step = 1 and Left swing foot:  Passes left stance foot = 1 and Completely clears floor with step = 1  12.Step symmetry: Right & Left step length appear equal = 1  13.Step continuity : Steps appear continuous = 1  14.Path: Straight without walking aid = 2  15.Trunk : No sway, no flexion, no use of arms, and no use of walking aid = 2  16.Walking Time: Heels amost touching while walking = 1    Gait Score: 12/12  Balance score:15/16  Total Score=Balance + Gait Score: 27/28     Risk Indicators:    Tinetti Tool Score   Risk of Falls   ?18    High   19-23   Moderate   ?24   Low        Functional Mobility:  Bed Mobility:   · Scooting to HOB via supine bridge: supervision  · Scooting to EOB to have both feet planted on floor: supervision  · Supine to Sit: stand by assistance; from right side of bed  · Sit to Supine: stand by assistance; to right side of bed    Sitting Balance at Edge of Bed:  · Assistance Level Required: supervision  · Time: 5 minutes  · Postural deviations noted: no deviations noted    Transfers:   · Sit <> Stand Transfer: stand by assistance with no assistive device   · Stand <> Sit Transfer: stand by assistance with no assistive device   · x1 from EOB      Gait:  · Patient ambulated: 10ft within room (limited by tPA window)   · Patient required: standy by assistance  · Patient used:  No Assistive Device  · Gait Pattern observed: reciprocal gait  · Gait Deviation(s): no deviations noted    Therapeutic Activities & Exercises:     Education:  Patient provided with daily orientation and goals of this PT session. Patient and family agreed to participate in session. Patient and family aware of patient's deficits and  therapy progression. They were educated to transfer with RN/PCT only; CGA of 1 person. Encouraged patient to perform daily exercises & mobility to increase endurance and decrease effects of bedrest. Time provided for therapeutic counseling and discussion of health disposition. All questions answered to patient's and family's satisfaction, within scope of PT practice; voiced no other concerns. White board updated in patient's room, RN notified of session.    Patient left with bed in chair position, with head in midline, neutral pelvis & heels floated for skin protection with all lines intact, call button in reach, bed alarm on, RN notified and family present    AM-PAC 6 CLICK MOBILITY  Total Score:23     Assessment:     Flakito Felder is a 43 y.o. male admitted with a medical diagnosis of Cerebrovascular accident (CVA) due to embolism of left middle cerebral artery.  He presents with the following impairments/functional limitations: impaired communication. Pt with good strength and balance, however limited by expressive aphasia. Flakito Alonsos deficits effect their ability to safely and independently participate in self-care tasks and functional mobility which increases their caregiver burden. Due to his physical therapy diagnosis of debility and deconditioning, they continue to benefit from acute PT services to address the following limitations: high fall risk, weakness, instability, the need for supervised instruction of exercise. Flakito Alonsos deficits effect their roles and responsibilities in which they were able to complete prior to admit. Education was provided to patient & family regarding importance of continued participation in therapy, patient's progress and discharge disposition. Pt would benefit from an intensive and collaborative PT/OT/SLP therapy program to improve quality of life and focus on recovery of impairments.     Problem List: impaired functional mobilty, impaired cognition, decreased  coordination(decreased communication)  Rehab Prognosis: good; patient would benefit from acute skilled PT services to address these deficits and reach maximum level of function.      GOALS:   Multidisciplinary Problems     Physical Therapy Goals        Problem: Physical Therapy Goal    Goal Priority Disciplines Outcome Goal Variances Interventions   Physical Therapy Goal     PT, PT/OT Ongoing (interventions implemented as appropriate)     Description:  Goals to be met by: 9/28/2018    Patient will increase functional independence with mobility by performing:    Supine <> sit with Supervision.  Sit <> stand transfer with Supervision using No Assistive Device.  Bed <> chair transfer via Stand Pivot with Supervision using No Assistive Device.  Gait  x 150 feet with Supervision using No Assistive Device to prepare for community ambulation and endurance activities.  Dynamic standing for 10 minutes with Stand-by Assistance using No Assistive Device to prepare for functional tasks in standing.  Able to tolerate exercise for 15-20 reps with supervision.                         Clinical Decision Making:   On examination of body system using standardized tests and measures patient presents with 3 or more elements from any of the following: body structures and functions, activity limitations, and/or participation restrictions.  Leading to a clinical presentation that is considered evolving with changing characteristics  Evaluation Complexity:  Moderate - 02507      Time Tracking:     PT Received On: 09/16/18  PT Start Time: 1107     PT Stop Time: 1120  PT Total Time (min): 13 min     Billable Minutes: Evaluation 13    Latricia Reilly PT, DPT  09/16/2018  Pager:[phone]

## 2018-09-16 NOTE — H&P
[facility]  Neurocritical Care  History & Physical    Admit Date: 9/16/2018  Service Date: 09/16/2018  Length of Stay: 0    Subjective:     Chief Complaint: Cerebrovascular accident (CVA) due to embolism of left middle cerebral artery    History of Present Illness: Mr. Felder is a 43 year old male with no known pmhx who presents to [facility] for a higher level of care for LMCA s/p TPA and Thrombectomy. The patient was transferred from [facility] where he initially presented with right  sided weakness and aphasic at 10:00PM yesterday. TPA end time was 0151.      No past medical history on file.  No past surgical history on file.   No current facility-administered medications on file prior to encounter.      No current outpatient medications on file prior to encounter.      Allergies: Patient has no known allergies.    No family history on file.  Social History     Tobacco Use    Smoking status: Not on file   Substance Use Topics    Alcohol use: Not on file    Drug use: Not on file     Review of Systems   Unable to assess due to expressive aphasia  Objective:     Vitals:    Pulse: 76  Rhythm: normal sinus rhythm  BP: (!) 156/79  Resp: (!) 23  SpO2: (!) 94 %  O2 Device (Oxygen Therapy): room air    Pulse  Min: 74  Max: 94  BP  Min: 129/68  Max: 172/93  Resp  Min: 11  Max: 23  SpO2  Min: 94 %  Max: 99 %    No intake/output data recorded.           Physical Exam  GA: Alert, comfortable, no acute distress.   HEENT: No scleral icterus or JVD.   Pulmonary: Clear to auscultation A/P/L. No wheezing, crackles, or rhonchi.  Cardiac: RRR S1 & S2 w/o rubs/murmurs/gallops.   Abdominal: Bowel sounds present x 4. No appreciable hepatosplenomegaly.  Skin: No jaundice, rashes, or visible lesions.  Neuro:  --GCS: E4 V1 M6  --Mental Status:  AA, following commands in all exts, weak in the RUE, expressive aphasia   --CN II-XII grossly intact.   --Pupils 5mm, PERRL.   --Corneal reflex, gag, cough  intact.  --LUE strength: 5/5  --LLE strength: 5/5  --RUE strength: 3/5  --RLE strength: 5/5      Today I personally reviewed pertinent medications, lines/drains/airways, imaging, cardiology, lab results, microbiology results, notably:        Assessment/Plan:     Neuro   * Cerebrovascular accident (CVA) due to embolism of left middle cerebral artery    S/p tPA and Thrombectomy   -- Continue Neuro checks q 1hr  -- Vascular Neurology consulted  -- SBP goal < 140  -- PT/OT/Speech  -- CXR pending  -- 2D echo pending  -- 09/16: CTH revealed No acute intracranial abnormality. Specifically, no hemorrhage or acute major vascular distribution infarct.  -- 09/16: IR angio: L M1 occlusion, treated successfully w thrombectomy to TICI 3  -- Mri ordered at 8pm              Cytotoxic cerebral edema    -- See CVA        Cardiac/Vascular   Essential hypertension    -- Continue to monitor HR and BP   -- SBP goal < 140  -- 2D echo pending  -- Labetalol Prn  -- Ekg pending        Hematology   S/P admn tPA in diff fac w/n last 24 hr bef adm to crnt fac    -- See CVA              Activity Orders          None        Full Code    Clay Pacheco NP  Neurocritical Care  [facility]

## 2018-09-16 NOTE — PLAN OF CARE
Problem: SLP Goal  Goal: SLP Goal  Speech Language Pathology Goals  Goals expected to be met by 9/23  1. Ongoing swallow assessment  2. SLP Speech/Language/Cognitive Evaluation  SLP Clinical Swallow Evaluation completed. See note for details.     Lilly Ulloa MS, CCC-SLP  Speech Language Pathologist  Pager: [phone]  9/16/2018

## 2018-09-16 NOTE — CONSULTS
42 yo w L MCA syndrome.  Treated w alteplase.    Has improved but still symptomatic.    Will proceed w angiogram +/- thrombectomy.    Consented verbally but due to aphasia, I also contacted his mother Safia Felder who agreed with the plan and understood the risks.    ASA: 1  Mall: 1    Titi King MD  Vascular and Interventional Neurology Staff  Director of [facility]  [facility]  [phone]

## 2018-09-16 NOTE — ED TRIAGE NOTES
Pt presents to ED from [facility] from [facility] for CVA. Pt was LSN at 2300 and woke up at 2330 with right sided paralysis and global aphasia. Pt received TPA at outside facility. Upon arrival pt is AAOx4, no drift to right side, expressive aphasia. Pt denies HA, CP, SOB. Reports no medical hx.

## 2018-09-16 NOTE — PLAN OF CARE
Problem: Physical Therapy Goal  Goal: Physical Therapy Goal  Goals to be met by: 9/28/2018    Patient will increase functional independence with mobility by performing:    Supine <> sit with Supervision.  Sit <> stand transfer with Supervision using No Assistive Device.  Bed <> chair transfer via Stand Pivot with Supervision using No Assistive Device.  Gait  x 150 feet with Supervision using No Assistive Device to prepare for community ambulation and endurance activities.  Dynamic standing for 10 minutes with Stand-by Assistance using No Assistive Device to prepare for functional tasks in standing.  Able to tolerate exercise for 15-20 reps with supervision.      Outcome: Ongoing (interventions implemented as appropriate)    Pt would benefit from OPPT/OT/SLP upon discharge.  Appropriate transfer level with nursing staff: Bed <> Chair:  Stand Pivot with Stand-by Assistance with 1 person.    Latricia Reilly PT, DPT  9/16/2018  Pager: [phone]

## 2018-09-16 NOTE — NURSING
Pt received from IR on cardene gtt @2.5mg/h aphasic able to follow commands on left side withdraws on right with some gross motor movements, PAC notified of arrival to floor orders noted on chart will cont to monitor.

## 2018-09-16 NOTE — ED PROVIDER NOTES
Encounter Date: 9/16/2018       History     Chief Complaint   Patient presents with    TPA [facility] Transfer     Patient transferred from [facility] after receiving TPA. Patient was last seen normal at 2300, patient woke up at 2330 with global aphasia and right sided paralysis. CTA showed left M2 segment of MCA blockage. Original NIH was 20, after TPA NIH was 11. Patient recieved TPA bolus of 8.5mg at 0050, TPA infusion of 76.1mg at 0051, which was stopped at 0151. Creatinine was 1.25.      Patient is a 43 year old male with unknown PMHx. He presents to the ED via EMS for CVA. Patient was transferred from [facility] for Vascular neurology. According to records, patient with right sided weakness and aphasic at 10:00PM. Patient consumed alcohol this evening. Patient underwent CTA stroke multiphase and found to have occlusion of proximal left M2 segment of MCA. Patient received tPA at previous facility.           Review of patient's allergies indicates:  Not on File  No past medical history on file.  No past surgical history on file.  No family history on file.  Social History     Tobacco Use    Smoking status: Not on file   Substance Use Topics    Alcohol use: Not on file    Drug use: Not on file     Review of Systems   Unable to perform ROS: Acuity of condition       Physical Exam     Initial Vitals [09/16/18 0341]   BP Pulse Resp Temp SpO2   129/68 82 18 -- 98 %      MAP       --         Physical Exam    Vitals reviewed.  Constitutional: He appears well-developed and well-nourished. No distress.   HENT:   Head: Normocephalic.   Eyes: Conjunctivae and EOM are normal. Pupils are equal, round, and reactive to light.   Neck: Normal range of motion.   Cardiovascular: Normal rate and regular rhythm.   No murmur heard.  Pulmonary/Chest: Breath sounds normal. No respiratory distress. He has no wheezes. He has no rales.   Abdominal: Soft. Bowel sounds are normal. He exhibits no distension.  There is no tenderness.   Musculoskeletal: Normal range of motion.   Neurological: He is alert. He has normal strength. No sensory deficit. Coordination abnormal.   Motor strength of b/l UE and LE 5/5. Heel to shin negative. Pronator drift negative. No facial droop or asymmetry. Tongue protrudes to right with no fasciculations. Aphasic. Finger to nose positive. Gait not assessed.    Skin: Skin is warm and dry. No erythema.         ED Course   Procedures  Labs Reviewed   CBC W/ AUTO DIFFERENTIAL - Abnormal; Notable for the following components:       Result Value    WBC 13.82 (*)     RDW 15.0 (*)     Gran # (ANC) 11.2 (*)     Immature Grans (Abs) 0.07 (*)     Gran% 80.9 (*)     Lymph% 13.1 (*)     All other components within normal limits   HEMOGLOBIN A1C   URINALYSIS   DRUG SCREEN PANEL, URINE EMERGENCY   TYPE & SCREEN   POCT GLUCOSE MONITORING CONTINUOUS          Imaging Results          X-Ray Chest AP Single View (In process)                IR Angiogram Carotid Cerebral Bilateral (In process)                CT Head Without Contrast (Final result)  Result time 09/16/18 05:08:31    Final result by Rex Nagel MD (09/16/18 05:08:31)                 Impression:      No acute intracranial abnormality.  Specifically, no hemorrhage or acute major vascular distribution infarct.    Left sphenoid sinus mucous retention cyst.  Mild mucosal thickening of ethmoid air cells.x    Electronically signed by resident: Shabbir Beckford  Date:    09/16/2018  Time:    04:38    Electronically signed by: Rex Nagel MD  Date:    09/16/2018  Time:    05:08             Narrative:    EXAMINATION:  CT HEAD WITHOUT CONTRAST    CLINICAL HISTORY:  Stroke;    TECHNIQUE:  Low dose axial CT images obtained throughout the head without intravenous contrast. Sagittal and coronal reconstructions were performed.    COMPARISON:  None.    FINDINGS:  Intracranial compartment:    Ventricles and sulci are unremarkable without evidence of  hydrocephalus.  No extra-axial blood or fluid collections.    The brain parenchyma is unremarkable.  No parenchymal mass, hemorrhage, edema or acute major vascular distribution infarct.    Skull/extracranial contents (limited evaluation): No fracture. There is minimal opacification throughout mastoid air cells bilaterally without evidence of osseous destruction or cholesteatoma.  Near complete opacification of the left sphenoid sinus likely representing a mucous retention cyst.  Mild mucosal thickening throughout the ethmoid air cells.                                       APC / Resident Notes:   Patient is a 43 year old male with unknown PMHx. He presents to the ED via EMS for CVA.    Will order imaging. Will consult vascular neurology and neuro critical care. Will continue to monitor.     Differential diagnoses include, but are not limited to: CVA.     Appreciate vascular neurology emergent consult. They will transfer patient to IR for further management.     I have discussed and reviewed with my supervising physician.        Clinical Impression:   The primary encounter diagnosis was Cerebrovascular accident (CVA) due to embolism of left middle cerebral artery. A diagnosis of Status post administration of tPA (rtPA) in a different facility within the last 24 hours prior to admission to current facility was also pertinent to this visit.      Disposition:   Disposition: Admitted  Condition: Critical                        Crystal Stewart PA-C  09/16/18 0611

## 2018-09-17 PROBLEM — F12.10 MILD TETRAHYDROCANNABINOL (THC) ABUSE: Status: ACTIVE | Noted: 2018-09-17

## 2018-09-17 LAB
ALBUMIN SERPL BCP-MCNC: 3.7 G/DL
ALP SERPL-CCNC: 76 U/L
ALT SERPL W/O P-5'-P-CCNC: 29 U/L
ANION GAP SERPL CALC-SCNC: 8 MMOL/L
AST SERPL-CCNC: 30 U/L
BASOPHILS # BLD AUTO: 0.03 K/UL
BASOPHILS NFR BLD: 0.3 %
BILIRUB SERPL-MCNC: 1.1 MG/DL
BUN SERPL-MCNC: 9 MG/DL
CALCIUM SERPL-MCNC: 9.1 MG/DL
CHLORIDE SERPL-SCNC: 105 MMOL/L
CO2 SERPL-SCNC: 25 MMOL/L
CREAT SERPL-MCNC: 1.2 MG/DL
DIFFERENTIAL METHOD: ABNORMAL
EOSINOPHIL # BLD AUTO: 0.2 K/UL
EOSINOPHIL NFR BLD: 1.9 %
ERYTHROCYTE [DISTWIDTH] IN BLOOD BY AUTOMATED COUNT: 15.2 %
EST. GFR  (AFRICAN AMERICAN): >60 ML/MIN/1.73 M^2
EST. GFR  (NON AFRICAN AMERICAN): >60 ML/MIN/1.73 M^2
GLUCOSE SERPL-MCNC: 103 MG/DL
HCT VFR BLD AUTO: 43.2 %
HGB BLD-MCNC: 14.4 G/DL
IMM GRANULOCYTES # BLD AUTO: 0.04 K/UL
IMM GRANULOCYTES NFR BLD AUTO: 0.4 %
INR PPP: 1.1
LYMPHOCYTES # BLD AUTO: 2.7 K/UL
LYMPHOCYTES NFR BLD: 25.7 %
MAGNESIUM SERPL-MCNC: 2.2 MG/DL
MCH RBC QN AUTO: 31 PG
MCHC RBC AUTO-ENTMCNC: 33.3 G/DL
MCV RBC AUTO: 93 FL
MONOCYTES # BLD AUTO: 0.8 K/UL
MONOCYTES NFR BLD: 7.6 %
NEUTROPHILS # BLD AUTO: 6.7 K/UL
NEUTROPHILS NFR BLD: 64.1 %
NRBC BLD-RTO: 0 /100 WBC
PHOSPHATE SERPL-MCNC: 2.6 MG/DL
PLATELET # BLD AUTO: 296 K/UL
PLATELET BLD QL SMEAR: ABNORMAL
PMV BLD AUTO: 9.7 FL
POCT GLUCOSE: 107 MG/DL (ref 70–110)
POTASSIUM SERPL-SCNC: 4 MMOL/L
PROT SERPL-MCNC: 7 G/DL
PROTHROMBIN TIME: 11.2 SEC
RBC # BLD AUTO: 4.65 M/UL
SODIUM SERPL-SCNC: 138 MMOL/L
WBC # BLD AUTO: 10.51 K/UL

## 2018-09-17 PROCEDURE — 25000003 PHARM REV CODE 250: Performed by: PHYSICIAN ASSISTANT

## 2018-09-17 PROCEDURE — 20600001 HC STEP DOWN PRIVATE ROOM

## 2018-09-17 PROCEDURE — 25000003 PHARM REV CODE 250: Performed by: NURSE PRACTITIONER

## 2018-09-17 PROCEDURE — 94761 N-INVAS EAR/PLS OXIMETRY MLT: CPT

## 2018-09-17 PROCEDURE — 84100 ASSAY OF PHOSPHORUS: CPT

## 2018-09-17 PROCEDURE — 92526 ORAL FUNCTION THERAPY: CPT

## 2018-09-17 PROCEDURE — 63600175 PHARM REV CODE 636 W HCPCS: Performed by: NURSE PRACTITIONER

## 2018-09-17 PROCEDURE — 99232 SBSQ HOSP IP/OBS MODERATE 35: CPT | Mod: ,,, | Performed by: PHYSICIAN ASSISTANT

## 2018-09-17 PROCEDURE — 85025 COMPLETE CBC W/AUTO DIFF WBC: CPT

## 2018-09-17 PROCEDURE — 99233 SBSQ HOSP IP/OBS HIGH 50: CPT | Mod: ,,, | Performed by: PSYCHIATRY & NEUROLOGY

## 2018-09-17 PROCEDURE — 92523 SPEECH SOUND LANG COMPREHEN: CPT

## 2018-09-17 PROCEDURE — 80053 COMPREHEN METABOLIC PANEL: CPT

## 2018-09-17 PROCEDURE — 25000003 PHARM REV CODE 250: Performed by: PSYCHIATRY & NEUROLOGY

## 2018-09-17 PROCEDURE — A4216 STERILE WATER/SALINE, 10 ML: HCPCS | Performed by: NURSE PRACTITIONER

## 2018-09-17 PROCEDURE — 85610 PROTHROMBIN TIME: CPT

## 2018-09-17 PROCEDURE — 83735 ASSAY OF MAGNESIUM: CPT

## 2018-09-17 RX ORDER — LABETALOL HYDROCHLORIDE 5 MG/ML
10 INJECTION, SOLUTION INTRAVENOUS EVERY 4 HOURS PRN
Status: DISCONTINUED | OUTPATIENT
Start: 2018-09-17 | End: 2018-09-18

## 2018-09-17 RX ORDER — HEPARIN SODIUM 5000 [USP'U]/ML
5000 INJECTION, SOLUTION INTRAVENOUS; SUBCUTANEOUS EVERY 8 HOURS
Status: DISCONTINUED | OUTPATIENT
Start: 2018-09-17 | End: 2018-09-18

## 2018-09-17 RX ORDER — HYDRALAZINE HYDROCHLORIDE 20 MG/ML
10 INJECTION INTRAMUSCULAR; INTRAVENOUS EVERY 6 HOURS PRN
Status: DISCONTINUED | OUTPATIENT
Start: 2018-09-17 | End: 2018-09-18

## 2018-09-17 RX ORDER — NAPROXEN SODIUM 220 MG/1
81 TABLET, FILM COATED ORAL DAILY
Status: DISCONTINUED | OUTPATIENT
Start: 2018-09-17 | End: 2018-09-21 | Stop reason: HOSPADM

## 2018-09-17 RX ADMIN — ASPIRIN 81 MG CHEWABLE TABLET 81 MG: 81 TABLET CHEWABLE at 11:09

## 2018-09-17 RX ADMIN — Medication 3 ML: at 01:09

## 2018-09-17 RX ADMIN — ATORVASTATIN CALCIUM 40 MG: 20 TABLET, FILM COATED ORAL at 09:09

## 2018-09-17 RX ADMIN — HEPARIN SODIUM 5000 UNITS: 5000 INJECTION, SOLUTION INTRAVENOUS; SUBCUTANEOUS at 01:09

## 2018-09-17 RX ADMIN — HEPARIN SODIUM 5000 UNITS: 5000 INJECTION, SOLUTION INTRAVENOUS; SUBCUTANEOUS at 08:09

## 2018-09-17 RX ADMIN — SODIUM CHLORIDE 1000 ML: 0.9 INJECTION, SOLUTION INTRAVENOUS at 01:09

## 2018-09-17 RX ADMIN — SODIUM CHLORIDE: 0.9 INJECTION, SOLUTION INTRAVENOUS at 09:09

## 2018-09-17 RX ADMIN — HEPARIN SODIUM 5000 UNITS: 5000 INJECTION, SOLUTION INTRAVENOUS; SUBCUTANEOUS at 10:09

## 2018-09-17 RX ADMIN — LISINOPRIL 10 MG: 10 TABLET ORAL at 09:09

## 2018-09-17 NOTE — PROGRESS NOTES
[facility]  Neurocritical Care  Progress Note    Admit Date: 9/16/2018  Service Date: 09/17/2018  Length of Stay: 1    Subjective:     Chief Complaint: Cerebrovascular accident (CVA) due to embolism of left middle cerebral artery    History of Present Illness: Mr. Felder is a 43 year old male with no known pmhx who presents to [facility] for a higher level of care for LMCA s/p TPA and Thrombectomy. The patient was transferred from [facility] where he initially presented with right  sided weakness and aphasic at 10:00PM yesterday. TPA end time was 0151.                        Hospital Course: 06/16: Admit to [facility], Ir angio/Thrombectomy, LMI TICI3  Exam with significant improvement, aphasia and R drift resolved.   MRI today with L BG acute infarcts as well as small regions of temporal and parietal lobe infarcts on left, embolic likely etiology. Echo results without abnormality. No hemorrhagic convervsion. ASA started and will ttf today.           Interval History: naeon    Review of Systems: Constitutional: Denies fevers or chills.  Pulmonary: Denies shortness of breath or cough.  Cardiology: Denies chest pain or palpitations.  GI: Denies abdominal pain or constipation.  Neurologic: Denies new weakness, headache, or paresthesias.    Vitals:   Temp: 98.5 °F (36.9 °C)  Pulse: (!) 59  Rhythm: normal sinus rhythm  BP: (!) 147/76  MAP (mmHg): 103  Resp: (!) 23  SpO2: 96 %  O2 Device (Oxygen Therapy): room air    Temp  Min: 98 °F (36.7 °C)  Max: 98.5 °F (36.9 °C)  Pulse  Min: 56  Max: 87  BP  Min: 101/55  Max: 158/87  MAP (mmHg)  Min: 75  Max: 117  Resp  Min: 15  Max: 45  SpO2  Min: 92 %  Max: 99 %    09/16 0701 - 09/17 0700  In: 2178 [I.V.:2178]  Out: 2200 [Urine:2200]   Unmeasured Output  Urine Occurrence: 0  Stool Occurrence: 0  Emesis Occurrence: 0  Pad Count: 0     Examination:   Constitutional: Well-nourished and -developed. No apparent distress.   Eyes: Conjunctiva clear, anicteric. Lids  no lesions.  Head/Ears/Nose/Mouth/Throat/Neck: Moist mucous membranes. External ears, nose atraumatic.   Cardiovascular: Regular rhythm. No murmurs. No leg edema.  Respiratory: Comfortable respirations. Clear to auscultation.  Gastrointestinal: No hernia. Soft, nondistended, nontender. + bowel sounds.    Neurologic:  -GCS 15, deficits completely resolved, no aphasia, no dysarthria   -Alert. Oriented to person, place, and time. Speech fluent. Follows commands.  -Cranial nerves intac  -Motor 5/5 strength bilaterally  -Sensation intact      Medications:   Continuous    sodium chloride 0.9% Last Rate: 75 mL/hr at 09/17/18 1300   Scheduled    aspirin 81 mg Daily   atorvastatin 40 mg Daily   heparin (porcine) 5,000 Units Q8H   lisinopril 10 mg Daily   sodium chloride 0.9% 3 mL Q8H   PRN    acetaminophen 650 mg Q6H PRN   labetalol 10 mg Q4H PRN   ondansetron 4 mg Q6H PRN   sodium chloride 0.9% 5 mL PRN      Today I independently reviewed pertinent medications, lines/drains/airways, imaging, notably:   MRI brain, echo  ISTAT: No results for input(s): PH, PCO2, PO2, POCSATURATED, HCO3, BE, POCNA, POCK, POCTCO2, POCGLU, POCICA, POCLAC, SAMPLE in the last 24 hours.   Chem:   Recent Labs   Lab  09/17/18   0405   NA  138   K  4.0   CL  105   CO2  25   GLU  103   BUN  9   CREATININE  1.2   ESTGFRAFRICA  >60.0   EGFRNONAA  >60.0   CALCIUM  9.1   MG  2.2   PHOS  2.6*   ANIONGAP  8   PROT  7.0   ALBUMIN  3.7   BILITOT  1.1*   ALKPHOS  76   AST  30   ALT  29     Heme:   Recent Labs   Lab  09/17/18   0405   WBC  10.51   HGB  14.4   HCT  43.2   PLT  296   INR  1.1     Endo:   Recent Labs   Lab  09/16/18   1838   POCTGLUCOSE  116*      Assessment/Plan:     Neuro   * Cerebrovascular accident (CVA) due to embolism of left middle cerebral artery    S/p tPA and Thrombectomy   -- Continue Neuro checks q 1hr  -- Vascular Neurology consulted  -- SBP goal < 140  -- PT/OT/Speech  -- CXR pending  -- 2D echo pending  -- 09/16: IR angio: L M1  occlusion, treated successfully w thrombectomy to TICI 3  -- Mri L BG infarcts, no hemorrhagic conversion. Scattered small infarcts to L temporal and parietal regions.               Cytotoxic cerebral edema    -- See CVA        Cardiac/Vascular   Essential hypertension    -- Continue to monitor HR and BP   -- SBP goal < 150  -- 2D echo without abnormality, specifically no vegetations/thrombi  -- Labetalol Prn  --off cardene as of this morning, ACE added            Prophylaxis:  Venous Thromboembolism: mechanical chemical  Stress Ulcer: NA  Ventilator Pneumonia: not applicable     Activity Orders          None        Full Code    Karrie Gaston PA-C  Neurocritical Care  [facility]

## 2018-09-17 NOTE — SUBJECTIVE & OBJECTIVE
No past medical history on file.  No past surgical history on file.  No family history on file.  Social History     Tobacco Use    Smoking status: Not on file   Substance Use Topics    Alcohol use: Not on file    Drug use: Not on file     Review of patient's allergies indicates:  No Known Allergies    Medications: I have reviewed the current medication administration record.    No medications prior to admission.     Review of Systems   Constitutional: Negative for fatigue and fever.   Eyes: Negative for photophobia and visual disturbance.   Respiratory: Negative for chest tightness and shortness of breath.    Cardiovascular: Negative for chest pain, palpitations and leg swelling.   Gastrointestinal: Negative for abdominal distention, abdominal pain, diarrhea, nausea and vomiting.   Genitourinary: Negative for dysuria, frequency and urgency.   Musculoskeletal: Negative for arthralgias and back pain.   Skin: Negative for color change and pallor.   Neurological: Negative for dizziness, syncope, light-headedness and headaches.   Psychiatric/Behavioral: Negative for agitation and confusion.     Objective:     Vital Signs (Most Recent):  Temp: 98.5 °F (36.9 °C) (09/16/18 1901)  Pulse: 67 (09/16/18 2000)  Resp: (!) 32 (09/16/18 2000)  BP: (!) 127/59 (09/16/18 2000)  SpO2: 96 % (09/16/18 2000)    Vital Signs Range (Last 24H):  Temp:  [97.8 °F (36.6 °C)-98.5 °F (36.9 °C)]   Pulse:  []   Resp:  [10-46]   BP: (109-173)/()   SpO2:  [90 %-99 %]     Physical Exam   Constitutional: He is oriented to person, place, and time. He appears well-developed and well-nourished.   HENT:   Head: Normocephalic and atraumatic.   Eyes: EOM are normal. Pupils are equal, round, and reactive to light.   Neck: Normal range of motion. Neck supple. No JVD present.   Cardiovascular: Normal rate and regular rhythm. Exam reveals no friction rub.   No murmur heard.  Pulmonary/Chest: Effort normal and breath sounds normal. He has no  wheezes. He has no rales.   Abdominal: Soft. Bowel sounds are normal. He exhibits no distension. There is no tenderness.   Musculoskeletal: Normal range of motion. He exhibits no edema, tenderness or deformity.   Neurological: He is alert and oriented to person, place, and time. No cranial nerve deficit.   Skin: Skin is warm. Capillary refill takes less than 2 seconds. No erythema.       Neurological Exam:   LOC: alert  Attention Span: Good   Language: No aphasia  Orientation: Person, Place, Time   Visual Fields: Full  EOM (CN III, IV, VI): Full/intact  Pupils (CN II, III): PERRL  Facial Sensation (CN V): Normal  Facial Movement (CN VII): Symmetric facial expression    Reflexes: 2+ throughout  Cebellar: No evidence of appendicular or axial ataxia  Sensation: Intact to light touch, temperature and vibration      Laboratory:  CMP:   Recent Labs   Lab  09/18/18   0417   CALCIUM  8.8   ALBUMIN  3.4*   PROT  6.5   NA  135*   K  3.8   CO2  24   CL  104   BUN  10   CREATININE  1.3   ALKPHOS  70   ALT  23   AST  24   BILITOT  1.1*     CBC:   Recent Labs   Lab  09/18/18   0417   WBC  9.36   RBC  4.51*   HGB  14.1   HCT  42.6   PLT  311   MCV  95   MCH  31.3*   MCHC  33.1

## 2018-09-17 NOTE — ASSESSMENT & PLAN NOTE
S/p tPA and Thrombectomy   -- Continue Neuro checks q 1hr  -- Vascular Neurology consulted  -- SBP goal < 140  -- PT/OT/Speech  -- CXR pending  -- 2D echo pending  -- 09/16: IR angio: L M1 occlusion, treated successfully w thrombectomy to TICI 3  -- Mri L BG infarcts, no hemorrhagic conversion. Scattered small infarcts to L temporal and parietal regions.

## 2018-09-17 NOTE — CONSULTS
[facility]  Adult Nutrition  Consult Note    SUMMARY     Recommendations    Recommendation/Intervention:   Continue Regular diet. No ONS indicated at this time.     RD to monitor.    Goals: Pt to tolerate >50% of meals.  Nutrition Goal Status: new  Communication of RD Recs: reviewed with RN    Reason for Assessment    Reason for Assessment: consult  Diagnosis: stroke/CVA  Relevant Medical History: no known PMHx  Interdisciplinary Rounds: attended  General Information Comments: Pt's diet advanced per SLP recommendations. Pt reports adequate po intake PTA, no weight loss. NFPE completed- no physicals signs of malnutrition.  Nutrition Discharge Planning: adequate po intake for optimal nutrition    Nutrition Risk Screen    Nutrition Risk Screen: dysphagia or difficulty swallowing    Nutrition/Diet History    Do you have any cultural, spiritual, Christian conflicts, given your current situation?: no  Factors Affecting Nutritional Intake: None identified at this time    Anthropometrics    Temp: 98.5 °F (36.9 °C)  Height Method: Estimated  Height: 6' (182.9 cm)  Height (inches): 72 in  Weight Method: Bed Scale  Weight: 115.7 kg (255 lb 1.2 oz)  Weight (lb): 255.07 lb  Ideal Body Weight (IBW), Male: 178 lb  % Ideal Body Weight, Male (lb): 143.3 lb  BMI (Calculated): 34.7  BMI Grade: 30 - 34.9- obesity - grade I       Lab/Procedures/Meds    Pertinent Labs Reviewed: reviewed  Pertinent Medications Reviewed: reviewed    Physical Findings/Assessment    Overall Physical Appearance: nourished, obese  Skin: incision(s)    Estimated/Assessed Needs    Weight Used For Calorie Calculations: 115.7 kg (255 lb 1.2 oz)  Energy Calorie Requirements (kcal): 2625  Energy Need Method: [employer](PAL 1.25)  Protein Requirements: 116-139g(1.0-1.2g/kg)  Weight Used For Protein Calculations: 115.7 kg (255 lb 1.2 oz)  Fluid Requirements (mL): 1mL/kcal or per MD     RDA Method (mL): 2625         Nutrition Prescription  Ordered    Current Diet Order: Regular      Nutrition Risk    Level of Risk/Frequency of Follow-up: (f/u 1x/week)     Assessment and Plan    No nutrition diagnosis at this time.       Monitor and Evaluation    Food and Nutrient Intake: energy intake, food and beverage intake  Food and Nutrient Adminstration: diet order  Anthropometric Measurements: weight, body mass index, weight change  Biochemical Data, Medical Tests and Procedures: electrolyte and renal panel, gastrointestinal profile, glucose/endocrine profile, inflammatory profile, lipid profile  Nutrition-Focused Physical Findings: overall appearance     Nutrition Follow-Up    RD Follow-up?: Yes

## 2018-09-17 NOTE — PLAN OF CARE
Problem: Patient Care Overview  Goal: Plan of Care Review  Outcome: Ongoing (interventions implemented as appropriate)    No acute events throughout day. See vital signs and assessments in flowsheets. See below for updates on today's progress.     Pulmonary: Pt. on RA. SPO2 %.    Cardiovascular: VSS. SBP 130s-150s. Goal, SBP <150.     Neurological: AAOx4. A febrile. Slight drift to right side.     Gastrointestinal: No BM throughout shift.     Genitourinary: Condom catheter intact. Total  cc.     Integumentary/Other: No skin breakdown noted.     Infusions: NS 75 ml/hr.     Patient progressing towards goals as tolerated, plan of care communicated and reviewed with Flakito Felder and family. All concerns addressed. Will continue to monitor.

## 2018-09-17 NOTE — PLAN OF CARE
Problem: SLP Goal  Goal: SLP Goal  Speech Language Pathology Goals  Goals expected to be met by 9/24  1. Ongoing swallow assessment/met  2. SLP Speech/Language/Cognitive Evaluation /met  3.  Assess reading, writing visual spatial skills  4.  [place] to time and place  5.  Respond to mental manipulation tasks with 90% accuracy  6.  REspond to functional math and time calculations with 90% accuracy  7/  Respond to abstract problem solving tasks with 90% accuracy  Outcome: Ongoing (interventions implemented as appropriate)  Regular diet with thin liquids recommended.    Ириан Vega MA/[facility]-SLP  Speech Language Pathologist  Pager [phone]  9/17/2018

## 2018-09-17 NOTE — PLAN OF CARE
Problem: Patient Care Overview  Goal: Plan of Care Review  Outcome: Ongoing (interventions implemented as appropriate)   09/16/18 4751   Coping/Psychosocial   Plan Of Care Reviewed With patient;significant other;mother     Assumed care of patient at 1300 from MADHAVI Floyd. Patient with VS as documented. Cardene gtt titrated per orders, to maintain SBP < 140. Labetalol x1 dose administered r/t sustained elevated SBP. Assessments as documented. Report given to oncoming nurse for shift change, to assume care of patient at this time. Patient in no apparent distress. Neuro checks now hourly, as documented. Call bell in reach. Significant other at bedside.    Chloe Coyne RN

## 2018-09-17 NOTE — PT/OT/SLP EVAL
"Speech Language Pathology Evaluation  Cognitive Communication    Patient Name:  Flakito Felder   MRN:  [id number]  Admitting Diagnosis: Cerebrovascular accident (CVA) due to embolism of left middle cerebral artery    Recommendations:     Recommendations:                General Recommendations:  Cognitive-linguistic therapy  Diet recommendations:  Regular, Thin   Aspiration Precautions: Standard aspiration precautions   General Precautions: Standard, fall, aspiration  Communication strategies:  none    History:     No past medical history on file.    No past surgical history on file.    Social History: Patient lives with girlfriend.      Prior diet: regular      Subjective     "he was not talking yesterday..he is much better" per girlfriend  Patient goals: home  Pain/Comfort:  · Pain Rating 1: 0/10  · Pain Rating Post-Intervention 1: 0/10    Objective:   Cognitive Status:    Pt. was oriented to year only with fair recall of recent and poor recall of remote temporal and general information.  Immediate/delayed verbal recall was wfl with pt. Repeating 7 digits and 5 words without difficulty.    Responses to hypothetical verbal problem solving tasks were accurate and complete.  Pt. Compared and contrasted objects but did not generate multiple solutions to problems.  Thought organization and categorization skills were mildly impaired with pt. Naming 11 animals given one minute when 15-20 are wnl.  Pt. Responded to functional math and time calculations with 50% accuracy    Receptive Language:   Comprehension:   Pt. Responded to multi unit yes/no questions with 90% accuracy and 3 step commands with 80% accuracy    Pragmatics:    Flat affect/decreased insight    Expressive Language:  Verbal:    Pt. Able to express basic wants and needs inconversation and recalled nouns in response to questions with 100% accuracy      Motor Speech:  wfl    Voice:   Decreased intenisty noted    Visual-Spatial:  tba    Reading:   tba     Written " Expression:   tba    Treatment: Pt. Was observed self feeding 4 ounces of water and one rajiv cracker as well as 2 teaspoons of pudding.  Oral and pharyngeal phases of swallow were wfl with adequate mastication and bolus formation and no delay in transit.  INitiation of pharyngeal swallow did not appear delayed and laryngeal elevation appeared adequate to palpation.  No coughing, throat clearing or change in vocal quality noted following the swallow.      Assessment:   Flakito Felder is a 43 y.o. male with an SLP diagnosis of Cognitive-Linguistic Impairment.    Goals:   Multidisciplinary Problems     SLP Goals        Problem: SLP Goal    Goal Priority Disciplines Outcome   SLP Goal     SLP Ongoing (interventions implemented as appropriate)   Description:  Speech Language Pathology Goals  Goals expected to be met by 9/24  1. Ongoing swallow assessment/met  2. SLP Speech/Language/Cognitive Evaluation /met  3.  Assess reading, writing visual spatial skills  4.  [place] to time and place  5.  Respond to mental manipulation tasks with 90% accuracy  6.  REspond to functional math and time calculations with 90% accuracy  7/  Respond to abstract problem solving tasks with 90% accuracy                    Plan:   · Patient to be seen:  5 x/week   · Plan of Care expires:  10/15/18  · Plan of Care reviewed with:  patient, significant other   · SLP Follow-Up:  Yes       Discharge recommendations:  Discharge Facility/Level Of Care Needs: (outpt. vs. no needs)       Time Tracking:   SLP Treatment Date:   09/17/18  Speech Start Time:  1035  Speech Stop Time:  1055     Speech Total Time (min):  20 min    Billable Minutes: Eval 12 Treatment swallowing 8    Ирина Vega MA, CCC-SLP  09/17/2018

## 2018-09-17 NOTE — PLAN OF CARE
Problem: Patient Care Overview  Goal: Plan of Care Review  Outcome: Ongoing (interventions implemented as appropriate)  Nutrition assessment completed. Please see RD note for details.    Recommendation/Intervention:   Continue Regular diet. No ONS indicated at this time.     RD to monitor.    Goals: Pt to tolerate >50% of meals.  Nutrition Goal Status: new  Communication of RD Recs: reviewed with RN

## 2018-09-17 NOTE — PROGRESS NOTES
[facility]  Vascular Neurology  Comprehensive Stroke Center  Progress Note    Assessment/Plan:   43 y M with L MCA infarct s/p tpA and thrombectomy. He presented with right sided weakness and aphasia.     Today his weakness is much improved and near baseline normal strength. His aphasia has also improved.     -Antiplatelet/anticoagulations: Can start aspirin after 24 hr elapsed since tpA administration.  His risk factors for stroke include:  -HTN:   Modification: Currently goal SBP <140. He will need follow up with PCP and stroke clinic to manage HTN. Also BP monitoring at home recommended on discharge.  -Smoking: Patient counseled to quit. He will need smoking cessation plan.      Drug use:  patient  - HL: Continue statin - atorvastatin 40 mg po qd    Diagnostic orders pending:  TTE showed NL EF, rhythm and wall kinetics. Awaiting bubble study to r/o shunt    PT/OT/Speech continue per teams recs    Continue VTE prophylaxis     Disposition: Can step down to stroke floor.    STROKE DOCUMENTATION     Acute Stroke Times   Last Known Normal Date: 09/15/18  Last Known Normal Time: 2300  Symptom Onset Date: 09/15/18  Symptom Onset Time: 2330  Stroke Team Called Date: 09/16/18  Stroke Team Called Time: 0125  Stroke Team Arrival Date: 09/16/18  Stroke Team Arrival Time: 0130  CT Interpretation Time: 0039(no acute abnormality)  Decision to Treat Time for Alteplase: 0040(administered prior to telestroke consult)  Decision to Treat Time for IR: 0414(taken to IR for possible thrombectomy)    NIH Scale:      Current NIH Stroke Score Values      Most Recent Value   Interval  9/17   1a. Level Of Consciousness  0-->Alert: keenly responsive   1b. LOC Questions  0   1c. LOC Commands  0-->Performs both tasks correctly   2. Best Gaze  0-->Normal   3. Visual  0-->No visual loss   4. Facial Palsy 0   5a. Motor Arm, Left  0-->No drift: limb holds 90 (or 45) degrees for full 10 secs   5b. Motor Arm, Right  1    6a. Motor Leg, Left  0-->No drift: leg holds 30 degree position for full 5 secs   6b. Motor Leg, Right  1   7. Limb Ataxia  0-->Absent   8. Sensory  0-->Normal: no sensory loss   9. Best Language  0-->No aphasia: normal   10. Dysarthria  1   11. Extinction and Inattention (formerly Neglect)  0-->No abnormality   Total (NIH Stroke Scale) 3          Modified [place] Score: 0  Fabio Coma Scale:    ABCD2 Score:    [id number] Score:   HAS -BLED Score:   ICH Score:   Hunt & Hernandez Classification:       Thrombolysis Candidate? Yes, given prior to arrival at outside hospital      Interventional Revascularization Candidate?   Is the patient eligible for mechanical endovascular reperfusion (SAI)?  Yes; TICI 3     Hemorrhagic change of an Ischemic Stroke: Does this patient have an ischemic stroke with hemorrhagic changes? No     Subjective:   Review of Systems   Constitutional: Negative for chills and fever.   HENT: Negative for drooling.    Eyes: Negative for discharge, redness and visual disturbance.   Respiratory: Negative for cough and shortness of breath.    Cardiovascular: Negative for chest pain, palpitations and leg swelling.   Gastrointestinal: Positive for nausea. Negative for abdominal pain, diarrhea and vomiting.   Endocrine: Negative for cold intolerance and heat intolerance.   Genitourinary: Negative for hematuria.   Musculoskeletal: Negative for neck pain and neck stiffness.   Skin: Negative for rash.   Allergic/Immunologic: Negative for environmental allergies and food allergies.   Neurological: Positive for speech difficulty and weakness. Negative for dizziness and headaches.   Psychiatric/Behavioral: Negative for agitation      09/16 0700  09/17 0659  Most Recent     Temp (°F) 97.8-98.5 98.1-98.5  98.5 (36.9)   Pulse  59-70  67   Resp 10-46 15-33  25   //137 117//79  139/68   MAP (mmHg)    111   SpO2 (%) 90-99 94-96  95         Physical Exam   Constitutional: He is  oriented to person, place, and time. He appears well-developed and well-nourished. No distress.   HENT:   Head: Normocephalic and atraumatic.   Right Ear: External ear normal.   Left Ear: External ear normal.   Nose: Nose normal.   Mouth/Throat: Oropharynx is clear and moist. No oropharyngeal exudate.   Eyes: Conjunctivae and EOM are normal. Pupils are equal, round, and reactive to light. Right eye exhibits no discharge. Left eye exhibits no discharge. No scleral icterus.   Neck: Normal range of motion. Neck supple. No thyromegaly present.   Cardiovascular: Normal rate, regular rhythm, normal heart sounds and intact distal pulses.   No murmur heard.  Pulmonary/Chest: Effort normal and breath sounds normal. No respiratory distress. He has no wheezes.   Abdominal: Soft. Bowel sounds are normal. He exhibits no distension. There is no tenderness.   Musculoskeletal: He exhibits no edema.   Lymphadenopathy:     He has no cervical adenopathy.   Neurological: He is alert and oriented to person, place, and time.   Skin: Skin is warm and dry. He is not diaphoretic.   Psychiatric: He has a normal mood and affect.   Nursing note and vitals reviewed.        Neurological Exam:   LOC: alert  Attention Span: Good   Language: aohasia neglibile  Articulation: Dysarthria mild  Visual Fields: Full  EOM (CN III, IV, VI): Full/intact  Pupils (CN II, III): PERRL  Motor: Arm left  Normal 5/5  Leg left  Normal 5/5  Arm right  Paresis: 4+/5  Leg right Paresis: 4+/5  Sensation: Intact to light touch, temperature and vibration     Diagnostic Results:        Brain imaging:  CTH 09/16/2018 obtained at OSH, negative for acute findings    CTH here 9/17: No acute intracranial abnormality.  Specifically, no hemorrhage or acute major vascular distribution infarct.  Left sphenoid sinus mucous retention cyst.  Mild mucosal thickening of ethmoid air cells.x     MRI Brain: 9/17  Mild moderate sized left MCA distribution acute/recent infarction primarily  within the left basal ganglia as detailed above.    Punctate probable embolic infarction within the right posterior frontal subcortical white matter.    No evidence for significant mass effect or hemorrhagic conversion.     Vessel Imaging:  CTA Head and Neck 09/16/2018 obtained at OSH.  Occlusion of the proximal left M2 segment of the MCA.    MRA head: Reconstituted left M2 segmental branch of the MCA as identified on conventional angiogram.  No evidence for underlying high-grade stenosis or proximal occlusion..  Otherwise unremarkable motion limited MRA head as detailed above    MRA neck: Unremarkable motion limited MRA of the neck as detailed above without evidence for significant focal stenosis or occlusion.     Cardiac Evaluation:   2D Echo: (bubble pending)    1 - Normal left ventricular systolic function (EF 60-65%).     2 - No wall motion abnormalities.     3 - Normal left ventricular diastolic function.     4 - Normal right ventricular systolic function .     5 - The estimated PA systolic pressure is 23 mmHg.       Yoli Armas MD  Comprehensive Stroke Center  Department of Vascular Neurology   [facility]    Started first trimester

## 2018-09-17 NOTE — ASSESSMENT & PLAN NOTE
-- Continue to monitor HR and BP   -- SBP goal < 150  -- 2D echo without abnormality, specifically no vegetations/thrombi  -- Labetalol Prn  --off cardene as of this morning, ACE added

## 2018-09-17 NOTE — PLAN OF CARE
Patient does not have a pharmacy or PCP.  This CM spoke with patient at bedside. Was able to answer questions appropriately.       09/17/18 1015   Discharge Assessment   Assessment Type Discharge Planning Assessment   Confirmed/corrected address and phone number on facesheet? Yes   Assessment information obtained from? Patient   Expected Length of Stay (days) 3   Communicated expected length of stay with patient/caregiver no   Prior to hospitilization cognitive status: Alert/Oriented   Prior to hospitalization functional status: Independent   Current cognitive status: Alert/Oriented  (some aphasia)   Current Functional Status: Needs Assistance   Facility Arrived From: (airlift from [facility], [place])   Lives With other (see comments)  (family)   Able to Return to Prior Arrangements unable to determine at this time (comments)   Is patient able to care for self after discharge? Unable to determine at this time (comments)   Who are your caregiver(s) and their phone number(s)? (mother Safia Felder [phone])   Patient's perception of discharge disposition home or selfcare   Readmission Within The Last 30 Days no previous admission in last 30 days   Patient currently being followed by outpatient case management? No   Patient currently receives any other outside agency services? No   Equipment Currently Used at Home none   Do you have any problems affording any of your prescribed medications? No   Is the patient taking medications as prescribed? no   Does the patient have transportation home? Yes   Transportation Available family or friend will provide   Does the patient receive services at the Coumadin Clinic? No   Discharge Plan A Home   Patient/Family In Agreement With Plan yes     Susu Marrero RN/BSN/VENKATESH  [phone]  [facility]

## 2018-09-18 ENCOUNTER — TELEPHONE (OUTPATIENT)
Dept: NEUROLOGY | Facility: CLINIC | Age: 43
End: 2018-09-18

## 2018-09-18 ENCOUNTER — DOCUMENTATION ONLY (OUTPATIENT)
Dept: NEUROSURGERY | Facility: HOSPITAL | Age: 43
End: 2018-09-18

## 2018-09-18 ENCOUNTER — ANESTHESIA EVENT (OUTPATIENT)
Dept: MEDSURG UNIT | Facility: HOSPITAL | Age: 43
DRG: 023 | End: 2018-09-18

## 2018-09-18 PROBLEM — Q24.8 INTERATRIAL CARDIAC SHUNT: Status: ACTIVE | Noted: 2018-09-18

## 2018-09-18 LAB
ALBUMIN SERPL BCP-MCNC: 3.4 G/DL
ALP SERPL-CCNC: 70 U/L
ALT SERPL W/O P-5'-P-CCNC: 23 U/L
ANION GAP SERPL CALC-SCNC: 7 MMOL/L
AST SERPL-CCNC: 24 U/L
BASOPHILS # BLD AUTO: 0.05 K/UL
BASOPHILS NFR BLD: 0.5 %
BILIRUB SERPL-MCNC: 1.1 MG/DL
BUN SERPL-MCNC: 10 MG/DL
CALCIUM SERPL-MCNC: 8.8 MG/DL
CHLORIDE SERPL-SCNC: 104 MMOL/L
CO2 SERPL-SCNC: 24 MMOL/L
CREAT SERPL-MCNC: 1.3 MG/DL
DIASTOLIC DYSFUNCTION: NO
DIFFERENTIAL METHOD: ABNORMAL
EOSINOPHIL # BLD AUTO: 0.2 K/UL
EOSINOPHIL NFR BLD: 2.5 %
ERYTHROCYTE [DISTWIDTH] IN BLOOD BY AUTOMATED COUNT: 14.6 %
EST. GFR  (AFRICAN AMERICAN): >60 ML/MIN/1.73 M^2
EST. GFR  (NON AFRICAN AMERICAN): >60 ML/MIN/1.73 M^2
GLUCOSE SERPL-MCNC: 86 MG/DL
HCT VFR BLD AUTO: 42.6 %
HCYS SERPL-SCNC: 10.6 UMOL/L
HGB BLD-MCNC: 14.1 G/DL
IMM GRANULOCYTES # BLD AUTO: 0.02 K/UL
IMM GRANULOCYTES NFR BLD AUTO: 0.2 %
INR PPP: 1.2
LYMPHOCYTES # BLD AUTO: 3.1 K/UL
LYMPHOCYTES NFR BLD: 33.4 %
MAGNESIUM SERPL-MCNC: 2 MG/DL
MCH RBC QN AUTO: 31.3 PG
MCHC RBC AUTO-ENTMCNC: 33.1 G/DL
MCV RBC AUTO: 95 FL
MONOCYTES # BLD AUTO: 0.7 K/UL
MONOCYTES NFR BLD: 7.9 %
NEUTROPHILS # BLD AUTO: 5.2 K/UL
NEUTROPHILS NFR BLD: 55.5 %
NRBC BLD-RTO: 0 /100 WBC
PHOSPHATE SERPL-MCNC: 2.8 MG/DL
PLATELET # BLD AUTO: 311 K/UL
PMV BLD AUTO: 10.4 FL
POCT GLUCOSE: 122 MG/DL (ref 70–110)
POCT GLUCOSE: 134 MG/DL (ref 70–110)
POTASSIUM SERPL-SCNC: 3.8 MMOL/L
PROT SERPL-MCNC: 6.5 G/DL
PROTHROMBIN TIME: 12.2 SEC
RBC # BLD AUTO: 4.51 M/UL
RETIRED EF AND QEF - SEE NOTES: 55 (ref 55–65)
SODIUM SERPL-SCNC: 135 MMOL/L
WBC # BLD AUTO: 9.36 K/UL

## 2018-09-18 PROCEDURE — 83090 ASSAY OF HOMOCYSTEINE: CPT

## 2018-09-18 PROCEDURE — 83735 ASSAY OF MAGNESIUM: CPT

## 2018-09-18 PROCEDURE — 86147 CARDIOLIPIN ANTIBODY EA IG: CPT | Mod: 59

## 2018-09-18 PROCEDURE — 25000003 PHARM REV CODE 250: Performed by: PSYCHIATRY & NEUROLOGY

## 2018-09-18 PROCEDURE — 84100 ASSAY OF PHOSPHORUS: CPT

## 2018-09-18 PROCEDURE — 85303 CLOT INHIBIT PROT C ACTIVITY: CPT

## 2018-09-18 PROCEDURE — 97127 HC THERAPEUTIC INTVTN, COGN FUNCTION - OT: CPT

## 2018-09-18 PROCEDURE — 93307 TTE W/O DOPPLER COMPLETE: CPT | Mod: 26,,, | Performed by: INTERNAL MEDICINE

## 2018-09-18 PROCEDURE — 92507 TX SP LANG VOICE COMM INDIV: CPT

## 2018-09-18 PROCEDURE — 25000003 PHARM REV CODE 250: Performed by: NURSE PRACTITIONER

## 2018-09-18 PROCEDURE — 63600175 PHARM REV CODE 636 W HCPCS: Performed by: NURSE PRACTITIONER

## 2018-09-18 PROCEDURE — 93307 TTE W/O DOPPLER COMPLETE: CPT

## 2018-09-18 PROCEDURE — 93308 TTE F-UP OR LMTD: CPT

## 2018-09-18 PROCEDURE — 20600001 HC STEP DOWN PRIVATE ROOM

## 2018-09-18 PROCEDURE — 85305 CLOT INHIBIT PROT S TOTAL: CPT

## 2018-09-18 PROCEDURE — 36415 COLL VENOUS BLD VENIPUNCTURE: CPT

## 2018-09-18 PROCEDURE — 63600175 PHARM REV CODE 636 W HCPCS: Performed by: STUDENT IN AN ORGANIZED HEALTH CARE EDUCATION/TRAINING PROGRAM

## 2018-09-18 PROCEDURE — 85025 COMPLETE CBC W/AUTO DIFF WBC: CPT

## 2018-09-18 PROCEDURE — 80053 COMPREHEN METABOLIC PANEL: CPT

## 2018-09-18 PROCEDURE — 81240 F2 GENE: CPT

## 2018-09-18 PROCEDURE — 93010 ELECTROCARDIOGRAM REPORT: CPT | Mod: ,,, | Performed by: INTERNAL MEDICINE

## 2018-09-18 PROCEDURE — 81241 F5 GENE: CPT

## 2018-09-18 PROCEDURE — 85610 PROTHROMBIN TIME: CPT

## 2018-09-18 PROCEDURE — 85300 ANTITHROMBIN III ACTIVITY: CPT

## 2018-09-18 PROCEDURE — 99233 SBSQ HOSP IP/OBS HIGH 50: CPT | Mod: ,,, | Performed by: PSYCHIATRY & NEUROLOGY

## 2018-09-18 PROCEDURE — 97535 SELF CARE MNGMENT TRAINING: CPT

## 2018-09-18 PROCEDURE — 93005 ELECTROCARDIOGRAM TRACING: CPT

## 2018-09-18 RX ORDER — ATORVASTATIN CALCIUM 40 MG/1
40 TABLET, FILM COATED ORAL DAILY
Qty: 90 TABLET | Refills: 3 | Status: SHIPPED | OUTPATIENT
Start: 2018-09-19 | End: 2018-09-21 | Stop reason: HOSPADM

## 2018-09-18 RX ORDER — LISINOPRIL 10 MG/1
10 TABLET ORAL DAILY
Qty: 90 TABLET | Refills: 3 | Status: SHIPPED | OUTPATIENT
Start: 2018-09-19 | End: 2018-09-21

## 2018-09-18 RX ORDER — ENOXAPARIN SODIUM 100 MG/ML
40 INJECTION SUBCUTANEOUS EVERY 24 HOURS
Status: DISCONTINUED | OUTPATIENT
Start: 2018-09-18 | End: 2018-09-21 | Stop reason: HOSPADM

## 2018-09-18 RX ORDER — NAPROXEN SODIUM 220 MG/1
81 TABLET, FILM COATED ORAL DAILY
Refills: 0 | COMMUNITY
Start: 2018-09-19 | End: 2018-09-21

## 2018-09-18 RX ADMIN — ENOXAPARIN SODIUM 40 MG: 40 INJECTION, SOLUTION INTRAVENOUS; SUBCUTANEOUS at 05:09

## 2018-09-18 RX ADMIN — SODIUM CHLORIDE: 0.9 INJECTION, SOLUTION INTRAVENOUS at 07:09

## 2018-09-18 RX ADMIN — ATORVASTATIN CALCIUM 40 MG: 20 TABLET, FILM COATED ORAL at 08:09

## 2018-09-18 RX ADMIN — ASPIRIN 81 MG CHEWABLE TABLET 81 MG: 81 TABLET CHEWABLE at 08:09

## 2018-09-18 RX ADMIN — HEPARIN SODIUM 5000 UNITS: 5000 INJECTION, SOLUTION INTRAVENOUS; SUBCUTANEOUS at 05:09

## 2018-09-18 RX ADMIN — LISINOPRIL 10 MG: 10 TABLET ORAL at 08:09

## 2018-09-18 NOTE — DISCHARGE INSTRUCTIONS
-Please take your blood pressure medication. Lisinopril 10 mg, daily.  -Please take Atorvastatin (Lipitor) 40 mg, daily for cholesterol  -Please continue to take baby aspirin 81 mg. It is over the counter at any pharmacy.   -Please get a primary care physician in your area and follow up for blood pressure management.

## 2018-09-18 NOTE — PROGRESS NOTES
EKG performed; NSR with early repolarization. EKG placed in chart. Notified ASHWIN Ramirez of results. Stated she would take a look at pt's medications. No further orders at this time. Will monitor.

## 2018-09-18 NOTE — PROGRESS NOTES
Bubble study done x 2 with and without valsalva via left upper arm sl. Tolerated well. Sl flushed before and after with ns.

## 2018-09-18 NOTE — SUBJECTIVE & OBJECTIVE
History reviewed. No pertinent past medical history.    History reviewed. No pertinent surgical history.    Review of patient's allergies indicates:  No Known Allergies    No medications prior to admission.     Family History     None        Tobacco Use    Smoking status: Unknown If Ever Smoked    Smokeless tobacco: Never Used   Substance and Sexual Activity    Alcohol use: Not on file    Drug use: Not on file    Sexual activity: Not on file     Review of Systems   Constitution: Negative for weakness and malaise/fatigue.   Eyes: Negative for double vision.   Cardiovascular: Negative for chest pain, dyspnea on exertion and irregular heartbeat.   Respiratory: Negative for shortness of breath.    Gastrointestinal: Negative for nausea and vomiting.   Genitourinary: Negative for dysuria.   Neurological: Negative for dizziness, focal weakness, light-headedness, seizures and tremors.     Objective:     Vital Signs (Most Recent):  Temp: 98.7 °F (37.1 °C) (09/18/18 1551)  Pulse: (!) 56 (09/18/18 1602)  Resp: 18 (09/18/18 1551)  BP: 138/75 (09/18/18 1551)  SpO2: 97 % (09/18/18 1551) Vital Signs (24h Range):  Temp:  [98.5 °F (36.9 °C)-99.3 °F (37.4 °C)] 98.7 °F (37.1 °C)  Pulse:  [53-64] 56  Resp:  [16-20] 18  SpO2:  [93 %-98 %] 97 %  BP: (125-155)/(58-95) 138/75     Weight: 99.1 kg (218 lb 7.6 oz)  Body mass index is 29.63 kg/m².    SpO2: 97 %  O2 Device (Oxygen Therapy): room air      Intake/Output Summary (Last 24 hours) at 9/18/2018 1633  Last data filed at 9/18/2018 1400  Gross per 24 hour   Intake 1405 ml   Output 200 ml   Net 1205 ml       Lines/Drains/Airways     Peripheral Intravenous Line                 Peripheral IV - Single Lumen 09/16/18 Left Antecubital 2 days         Peripheral IV - Single Lumen 09/16/18 Right Hand 2 days                Physical Exam   Constitutional: He is oriented to person, place, and time. He appears well-developed and well-nourished. No distress.   HENT:   Head: Normocephalic and  atraumatic.   Mouth/Throat: Oropharynx is clear and moist.   Eyes: Pupils are equal, round, and reactive to light.   Neck: Neck supple. No JVD present.   Cardiovascular: Normal rate, regular rhythm, normal heart sounds and intact distal pulses. Exam reveals no gallop and no friction rub.   No murmur heard.  Pulmonary/Chest: Effort normal and breath sounds normal.   Abdominal: Soft. Bowel sounds are normal.   Musculoskeletal: Normal range of motion. He exhibits no edema.   Neurological: He is alert and oriented to person, place, and time. No cranial nerve deficit. Coordination normal.   Skin: Skin is warm and dry. He is not diaphoretic.   Psychiatric: He has a normal mood and affect. His behavior is normal.   Vitals reviewed.      Significant Labs:   BMP:   Recent Labs   Lab  09/17/18 0405  09/18/18 0417   GLU  103  86   NA  138  135*   K  4.0  3.8   CL  105  104   CO2  25  24   BUN  9  10   CREATININE  1.2  1.3   CALCIUM  9.1  8.8   MG  2.2  2.0     Recent Labs   Lab  09/18/18 0417   WBC  9.36   RBC  4.51*   HGB  14.1   HCT  42.6   PLT  311   MCV  95   MCH  31.3*   MCHC  33.1     Significant Imaging: Echocardiogram:   2D echo with color flow doppler:   Results for orders placed or performed during the hospital encounter of 09/16/18   Echo doppler color flow   Result Value Ref Range    EF 60 55 - 65    Diastolic Dysfunction No     Est. PA Systolic Pressure 23.43      Bubble Study  CONCLUSIONS     1 - Normal left ventricular systolic function (EF 55-60%).     2 - No wall motion abnormalities.     3 - Positive bubble study with intracardiac shunt, bubbles visible in left chambers within 3 and 4 beats on multiple injections.

## 2018-09-18 NOTE — HOSPITAL COURSE
"Patient came in with right sided weakness/aphasia MRI brain showing "Mild moderate sized left MCA distribution acute/recent infarction primarily within the left basal ganglia. Punctate probable embolic infarction within the right posterior frontal subcortical white matter." no vascular stenosis on CTA head & Neck.Patient is s/p tPA administration from OSH ([facility]) and IR thrombectomy here at [facility], currently with no neurological deficits up on discharge. HILDA with PFO. Closed by Interventional cardiology. Started on ASA/Plavix for 6 months. Cardiac event monitored ordered but unlikely to get due to pt not having insurance. Needs to follow up with Dr. Santos (Interventional cardiology) in 4 weeks. Needs insurance for follow up visit. Discussed with pt. Per family, they're working on acquiring one. Social work unable to get one. Pt lives in [place] and doesn't qualify for medicaid here.   "

## 2018-09-18 NOTE — PLAN OF CARE
CM notified by Stroke team to hold off on discharge at this time. CM notified Nurse Wale to hold discharge.

## 2018-09-18 NOTE — PLAN OF CARE
Problem: Patient Care Overview  Goal: Plan of Care Review  Outcome: Ongoing (interventions implemented as appropriate)  POC reviewed with pt. VS stable. HR 40s-50s and T wave elevated overnight. EKG showed NSR with early repolarization and Ashley, NP notified. NS infusing at 75cc/h as ordered. Neuro checks q4h. Pt AAO x4. CBG monitoring ACHS.  Pt remains free from falls, trauma, injury; pt tolerating POC well. Will continue to monitor.

## 2018-09-18 NOTE — PLAN OF CARE
1109-Attempted to contact patient's mom in reference to inquire if patient has insurance. Left message on voicemail.     1428-CM attempted to contact patient's mom again, left VM.    1511-Spoke to patient's mom-patient does not currently have insurance. She states that she believes that he applied for [place] Medicaid but she is not sure when that was done.

## 2018-09-18 NOTE — ASSESSMENT & PLAN NOTE
-pt presented with embolic stroke, w/u was negative for high grade atherosclerotic stenosis of cerebral arteries and atrial fibrillation (not seen on EKG or telemetry)   -pt had TTE with bubble study that was positive for intracardiac shunt; PAP not elevated  -pt will have HILDA tomorrow to better clarify shunt  -given that pt could have had a paradoxical emboli, would recommend that he undergo shunt closure however will need to wait at least a month; pt will be booked for an outpatient Interventional Cardiology follow up to discuss this

## 2018-09-18 NOTE — ANESTHESIA PREPROCEDURE EVALUATION
09/18/2018  Pre-operative evaluation for Procedure(s) (LRB):  ECHOCARDIOGRAM,TRANSESOPHAGEAL (N/A)    Flakito Felder is a 43 y.o. male with acute onset of aphasia and right sided weakness. Patient went to OSH and found to have clinically a left sided MCA stroke. Head CT non-contrast was done which was negative.MRI Brain showed mild to moderate sized left MCA distribution with acute/recent infarct of the left basal ganglia and punctate probable embolic infarction within right posterior frontal subcortical white matter. IR then performed a thrombectomy of the left M1 occlusion and established TICI flow 3. An MRA of the head and neck were done that did not show evidence for significant focal stenosis or occlusion. TTE was done that showe EF of 60-65%, no WMA and PAP of 23. A bubble study was done that was positive for intracardiac shunt. He is currently on ASA, statin, ACE-I, IVF NS and lovenox SQ injections. His BP is being controlled as well with PRN IV labetalol, with goal systolic <140. A HILDA has been ordered for 9/19. Interventional Cardiology is being consulted for right to left cardiac shunt and evaluation for ASD/PFO closure as it is thought that patient could have a paradoxical embolic event that lead to his stroke. He is scheduled for HILDA.       LDA:   - 18G PIV in Right Hand  - 18G PIV in Left AC    Prev airway: None on file    Drips: None    Patient Active Problem List   Diagnosis    S/P admn tPA in diff fac w/n last 24 hr bef adm to crnt fac    Cytotoxic cerebral edema    Cerebrovascular accident (CVA) due to embolism of left middle cerebral artery    Essential hypertension    Mild tetrahydrocannabinol (THC) abuse    Interatrial cardiac shunt       Review of patient's allergies indicates:  No Known Allergies     No current facility-administered medications on file prior to encounter.      No  current outpatient medications on file prior to encounter.       History reviewed. No pertinent surgical history.    Social History     Socioeconomic History    Marital status: Single     Spouse name: Not on file    Number of children: Not on file    Years of education: Not on file    Highest education level: Not on file   Social Needs    Financial resource strain: Not on file    Food insecurity - worry: Not on file    Food insecurity - inability: Not on file    Transportation needs - medical: Not on file    Transportation needs - non-medical: Not on file   Occupational History    Not on file   Tobacco Use    Smoking status: Unknown If Ever Smoked    Smokeless tobacco: Never Used   Substance and Sexual Activity    Alcohol use: Not on file    Drug use: Not on file    Sexual activity: Not on file   Other Topics Concern    Not on file   Social History Narrative    Not on file         Vital Signs Range (Last 24H):  Temp:  [36.9 °C (98.5 °F)-37.2 °C (98.9 °F)]   Pulse:  []   Resp:  [16-18]   BP: (125-155)/(58-95)   SpO2:  [93 %-97 %]       CBC:   Recent Labs      09/17/18   0405  09/18/18 0417   WBC  10.51  9.36   RBC  4.65  4.51*   HGB  14.4  14.1   HCT  43.2  42.6   PLT  296  311   MCV  93  95   MCH  31.0  31.3*   MCHC  33.3  33.1       CMP:   Recent Labs      09/17/18   0405  09/18/18 0417   NA  138  135*   K  4.0  3.8   CL  105  104   CO2  25  24   BUN  9  10   CREATININE  1.2  1.3   GLU  103  86   MG  2.2  2.0   PHOS  2.6*  2.8   CALCIUM  9.1  8.8   ALBUMIN  3.7  3.4*   PROT  7.0  6.5   ALKPHOS  76  70   ALT  29  23   AST  30  24   BILITOT  1.1*  1.1*       INR  Recent Labs      09/16/18   0509  09/17/18   0405  09/18/18 0417   INR  1.1  1.1  1.2       EKG:  Normal sinus rhythm  Early repolarization  Normal ECG  When compared with ECG of 16-SEP-2018 08:13,  Vent. rate has decreased BY  29 BPM  Confirmed by HAMILTON LIND MD ([id number]) on 9/18/2018 2:30:11 PM    Referred By: GRACIELA EASTMAN            Confirmed By:HAMILTON LIND MD    2D Echo:  CONCLUSIONS     1 - Normal left ventricular systolic function (EF 55-60%).     2 - No wall motion abnormalities.     3 - Positive bubble study with intracardiac shunt, bubbles visible in left chambers within 3 and 4 beats on multiple injections..       This document has been electronically    SIGNED BY: Jason Cai MD On: 09/18/2018 11:28        Anesthesia Evaluation    I have reviewed the Patient Summary Reports.    I have reviewed the Nursing Notes.      Review of Systems  Anesthesia Hx:  No problems with previous Anesthesia  History of prior surgery of interest to airway management or planning: Denies Family Hx of Anesthesia complications.   Denies Personal Hx of Anesthesia complications.   Hematology/Oncology:  Hematology Normal   Oncology Normal     EENT/Dental:EENT/Dental Normal   Cardiovascular:   Hypertension    Pulmonary:  Pulmonary Normal    Musculoskeletal:  Musculoskeletal Normal    Neurological:   CVA    Endocrine:  Endocrine Normal    Psych:  Psychiatric Normal           Physical Exam  General:  Well nourished    Airway/Jaw/Neck:  Airway Findings: Mouth Opening: Normal Tongue: Normal  General Airway Assessment: Adult  Oropharynx Findings: Normal Mallampati: I        Eyes/Ears/Nose:  EYES/EARS/NOSE FINDINGS: Normal   Dental:  DENTAL FINDINGS: Normal   Chest/Lungs:  Chest/Lungs Clear    Heart/Vascular:  Heart Findings: Normal Heart murmur: negative       Mental Status:  Mental Status Findings: Normal        Anesthesia Plan  Type of Anesthesia, risks & benefits discussed:  Anesthesia Type:  general  Patient's Preference:   Intra-op Monitoring Plan: standard ASA monitors  Intra-op Monitoring Plan Comments:   Post Op Pain Control Plan: multimodal analgesia  Post Op Pain Control Plan Comments:   Induction:   IV  Beta Blocker:  Patient is on a Beta-Blocker and has received one dose within the past 24 hours (No further documentation required).       Informed  Consent: Patient understands risks and agrees with Anesthesia plan.  Questions answered. Anesthesia consent signed with patient.  ASA Score: 2     Day of Surgery Review of History & Physical: I have interviewed and examined the patient. I have reviewed the patient's H&P dated:  There are no significant changes.          Ready For Surgery From Anesthesia Perspective.

## 2018-09-18 NOTE — PLAN OF CARE
Problem: Patient Care Overview  Goal: Plan of Care Review  Outcome: Ongoing (interventions implemented as appropriate)  Pt was scheduled to go home today but MD found a disconnection between his ventrical and atrium on his echo today and scheduled a HILDA for tomorrow. No falls, VSS. Still has slightly slurred speech and did get the date wrong this morning but oriented otherwise and good muscle control throughout. Will continue to monitor.

## 2018-09-18 NOTE — PT/OT/SLP PROGRESS
"Speech Language Pathology Treatment    Patient Name:  Flakito Felder   MRN:  [id number]  Admitting Diagnosis: Cerebrovascular accident (CVA) due to embolism of left middle cerebral artery    Recommendations:                 General Recommendations:  Speech/language therapy and Cognitive-linguistic therapy  Diet recommendations:  Regular, Liquid Diet Level: Thin   Aspiration Precautions: Standard aspiration precautions   General Precautions: Standard, fall  Communication strategies:  none    Subjective     "he is doing better"    Patient goals: home  Pain/Comfort:  · Pain Rating 1: 0/10  · Pain Rating Post-Intervention 1: 0/10    Objective:     Has the patient been evaluated by SLP for swallowing?   Yes  Keep patient NPO? No   Current Respiratory Status: room air      Pt. Was oriented to city, year and time of day only.  He named 3 category members with 100% accuracy given increased time and mod cues.  He responded to word deduction tasks with 90% accuracy given increased time and responded to category exclusion tasks in field of 4 with 90% accuracy.  Pt. Responded to mental manipulation tasks given 3 items with 80% accuracy with repetition needed and self corrections noted.  Verbalizations elicited were characterized by low vocal intensity with cues needed to increase volulme to improve intelligibiltiy.         Assessment:     Flakito Felder is a 43 y.o. male with an SLP diagnosis of Aphasia and Cognitive-Linguistic Impairment.  .    Goals:   Multidisciplinary Problems     SLP Goals        Problem: SLP Goal    Goal Priority Disciplines Outcome   SLP Goal     SLP Ongoing (interventions implemented as appropriate)   Description:  Speech Language Pathology Goals  Goals expected to be met by 9/24  1. Ongoing swallow assessment/met  2. SLP Speech/Language/Cognitive Evaluation /met  3.  Assess reading, writing visual spatial skills  4.  [place] to time and place  5.  Respond to mental manipulation tasks with 90% accuracy  6.  " REspond to functional math and time calculations with 90% accuracy  7/  Respond to abstract problem solving tasks with 90% accuracy                    Plan:     · Patient to be seen:  5 x/week   · Plan of Care expires:  10/15/18  · Plan of Care reviewed with:  patient, significant other   · SLP Follow-Up:  Yes       Discharge recommendations:  outpatient speech therapy       Time Tracking:     SLP Treatment Date:   09/18/18  Speech Start Time:  0820  Speech Stop Time:  0838     Speech Total Time (min):  18 min    Billable Minutes: Speech Therapy Individual 18    Ирина Vega MA, CCC-SLP  09/18/2018

## 2018-09-18 NOTE — PT/OT/SLP DISCHARGE
Occupational Therapy Discharge Summary    Flakito Felder  MRN: [id number]   Principal Problem: Cerebrovascular accident (CVA) due to embolism of left middle cerebral artery      Patient Discharged from acute Occupational Therapy on 9/18.  Please refer to prior OT note dated 9/18 for functional status.    Assessment:      Patient appropriate for care in another setting.    Objective:     GOALS:   Multidisciplinary Problems     Occupational Therapy Goals        Problem: Occupational Therapy Goal    Goal Priority Disciplines Outcome Interventions   Occupational Therapy Goal     OT, PT/OT     Description:  Goals set 9/18 to be addressed for 7 days with expiration date, 9/25:  Patient will increase functional independence with ADLs by performing:  Patient will demonstrate grooming while standing with modified independence.   Not met  Patient will demonstrate upper body dressing with modified independence while seated EOB.   Not met  Patient will demonstrate lower body dressing with modified independence while seated EOB.   Not met  Patient will demonstrate toileting with modified independence.   Not met  Patient will demonstrate bathing while seated EOB with modified independence.   Not met  Patient will demonstrate ability to follow 5/5 commands.   Not met  Patient will demonstrate independence with HEP for FMC right UE.    Not met  Patient and/or patient's family will verbalize understanding of stroke prevention guidelines, personal risk factors and stroke warning signs via teachback method.  Not met                            Reasons for Discontinuation of Therapy Services  Transfer to alternate level of care.      Plan:     Patient Discharged to: Outpatient Therapy Services    ANDREW Crump  9/18/2018

## 2018-09-18 NOTE — PLAN OF CARE
Problem: SLP Goal  Goal: SLP Goal  Speech Language Pathology Goals  Goals expected to be met by 9/24  1. Ongoing swallow assessment/met  2. SLP Speech/Language/Cognitive Evaluation /met  3.  Assess reading, writing visual spatial skills  4.  [place] to time and place  5.  Respond to mental manipulation tasks with 90% accuracy  6.  REspond to functional math and time calculations with 90% accuracy  7/  Respond to abstract problem solving tasks with 90% accuracy   Outcome: Ongoing (interventions implemented as appropriate)  Pt. Progressing towards goals.    Ирина Vega MA/[facility]-SLP  Speech Language Pathologist  Pager [phone]  9/18/2018

## 2018-09-18 NOTE — PT/OT/SLP PROGRESS
"Occupational Therapy   Treatment    Name: Flakito Felder  MRN: [id number]  Admitting Diagnosis:  Cerebrovascular accident (CVA) due to embolism of left middle cerebral artery       Recommendations:     Discharge Recommendations: outpatient OT  Discharge Equipment Recommendations:  none  Barriers to discharge:  None    Subjective   Patient:  "I plan to stop smoking and drinking."  Girlfriend:  "He is much better since you saw him.  He is talking more and he is eating.  He took a shower last night."  Communicated with: Nurse prior to session.  Pain/Comfort:  · Pain Rating 1: 0/10  · Pain Rating Post-Intervention 1: 0/10    Patients cultural, spiritual, Zoroastrian conflicts given the current situation: Buddhist    Objective:     Patient found with: bed alarm, peripheral IV, telemetry  Girlfriend present.  General Precautions: Standard, aspiration, fall   Orthopedic Precautions:N/A   Braces: N/A     Occupational Performance:    Bed Mobility:    · Patient completed Rolling/Turning to Left with  modified independence  · Patient completed Rolling/Turning to Right with modified independence  · Patient completed Scooting/Bridging with modified independence  · Patient completed Supine to Sit with modified independence  · Patient completed Sit to Supine with modified independence     Functional Mobility/Transfers:  · Patient completed Sit <> Stand Transfer with modified independence  with  no assistive device   · Patient completed Bed <> Chair Transfer using Stand Pivot technique with modified independence with no assistive device    Activities of Daily Living:  · Grooming:  Supervision while standing  · Upper Body Dressing: Supervision  · Lower Body Dressing: Supervision    Patient left supine with all lines intact, call button in reach and bed alarm on    AMPAC 6 Click:  AMPAC Total Score: 18    Treatment & Education:  Patient/ Family education provided for stroke warning signs, prevention guidelines and personal risk factors.  " Patient/ Family verbalizing understanding via teach back method.  Patient education provided on role of OT and need for outpt OT upon discharge.   Continued education, patient/ family training recommended.  Patient alert and oriented x 3; able to follow 4/4 one step commands.  Patient attentive and interactive throughout the session.  Patient able to identify 5/5 body parts.  Able to name 5/5 objects.  Able to sequence 7/7 days of the week and 10/12 months of the year.  Able to tie shoelaces with increased time.  Patient's functional status and disposition recommendation discussed with stroke team in daily rounds.  White board updated in patient's room.  OT asked if there were any other questions; patient/ family had no further questions.     Education:    Assessment:     Flakito Felder is a 43 y.o. male with a medical diagnosis of Cerebrovascular accident (CVA) due to embolism of left middle cerebral artery.  He presents with performance deficits affecting function are impaired self care skills, decreased coordination, impaired cognition, impaired fine motor.      Rehab Prognosis:  Good; patient would benefit from acute skilled OT services to address these deficits and reach maximum level of function.       Plan:     Patient to be seen 4 x/week to address the above listed problems via self-care/home management, therapeutic activities, therapeutic exercises, neuromuscular re-education, cognitive retraining, sensory integration  · Plan of Care Expires: 10/15/18  · Plan of Care Reviewed with: patient, significant other    This Plan of care has been discussed with the patient who was involved in its development and understands and is in agreement with the identified goals and treatment plan    GOALS:   Multidisciplinary Problems     Occupational Therapy Goals        Problem: Occupational Therapy Goal    Goal Priority Disciplines Outcome Interventions   Occupational Therapy Goal     OT, PT/OT     Description:  Goals set  9/18 to be addressed for 7 days with expiration date, 9/25:  Patient will increase functional independence with ADLs by performing:  Patient will demonstrate grooming while standing with modified independence.   Not met  Patient will demonstrate upper body dressing with modified independence while seated EOB.   Not met  Patient will demonstrate lower body dressing with modified independence while seated EOB.   Not met  Patient will demonstrate toileting with modified independence.   Not met  Patient will demonstrate bathing while seated EOB with modified independence.   Not met  Patient will demonstrate ability to follow 5/5 commands.   Not met  Patient will demonstrate independence with HEP for FMC right UE.    Not met  Patient and/or patient's family will verbalize understanding of stroke prevention guidelines, personal risk factors and stroke warning signs via teachback method.  Not met                            Time Tracking:     OT Date of Treatment: 09/18/18  OT Start Time: 0630  OT Stop Time: 0653  OT Total Time (min): 23 min    Billable Minutes:Self Care/Home Management 13  Cognitive Retraining 10    ANDREW Crump  9/18/2018

## 2018-09-18 NOTE — PROGRESS NOTES
09/17/18 2020   Vital Signs   Temp 98.8 °F (37.1 °C)   Temp src Oral   Pulse (!) 59   Heart Rate Source Monitor   Resp 18   SpO2 (!) 93 %   Pulse Oximetry Type Continuous   O2 Device (Oxygen Therapy) room air   BP (!) 142/88   MAP (mmHg) 110   BP Location Right arm   BP Method Automatic   Patient Position Lying     Notified JOSE RAUL Sprague of BP and HR. Per MAR, PRN labetalol for SBP >140 but notify if HR <60. JOSE RAUL Sprague stated she was okay with BP without interventions and she will adjust PRN medications. Will monitor.

## 2018-09-18 NOTE — PLAN OF CARE
Patient discharging home today. Follow-up appts have been scheduled and AVS has been updated. New RX meds to be delivered to hospital room. Family will provide transportation home.      09/18/18 125   Final Note   Assessment Type Final Discharge Note   Discharge Disposition Home   Right Care Referral Info   Post Acute Recommendation No Care

## 2018-09-18 NOTE — PLAN OF CARE
Problem: Occupational Therapy Goal  Goal: Occupational Therapy Goal  Goals set 9/16 to be addressed for 7 days with expiration date, 9/23:  Patient will increase functional independence with ADLs by performing:    Patient will demonstrate rolling to the right with modified independence.  Not met   Patient will demonstrate rolling to the left with modified independence.   Not met  Patient will demonstrate supine -sit with modified independence.   Not met  Patient will demonstrate stand pivot transfers with supervision.   Not met  Patient will demonstrate grooming while standing with supervision.   Not met  Patient will demonstrate upper body dressing with supervision while seated EOB.   Not met  Patient will demonstrate lower body dressing with supervision while seated EOB.   Not met  Patient will demonstrate toileting with supervision.   Not met  Patient will demonstrate bathing while seated EOB with supervision.   Not met  Patient will demonstrate ability to follow 5/5 commands.   Not met  Patient will demonstrate independence with HEP for FMC right UE.    Not met  Patient's family / caregiver will demonstrate independence and safety with assisting patient with self-care skills and functional mobility.     Not met  Patient and/or patient's family will verbalize understanding of stroke prevention guidelines, personal risk factors and stroke warning signs via teachback method.  Not met          Goals remain appropriate.  ANDREW Crump  9/18/2018

## 2018-09-18 NOTE — CONSULTS
[facility]  Interventional Cardiology  Consult Note    Patient Name: Flakito Felder  MRN: [id number]  Admission Date: 9/16/2018  Hospital Length of Stay: 2 days  Code Status: Full Code   Attending Provider: Tony Bhatt MD  Consulting Provider: Aminta Adan MD  Primary Care Physician: Primary Doctor No  Principal Problem:Cerebrovascular accident (CVA) due to embolism of left middle cerebral artery    Patient information was obtained from patient and ER records.     Inpatient consult to Interventional Cardiology  Consult performed by: Aminta Adan MD  Consult ordered by: Roosevelt Contreras MD  Reason for consult: embolic stroke, intra-atrial shunt        Subjective:     Chief Complaint:  Aphasia, Right sided weakness    HPI:  42 yo M with no PMHx who presents with acute onset of aphasia and right sided weakness. Patient went to OSH and found to have clinically a left sided MCA stroke. Head CT non-contrast was done which was negative. EKG revealed NSR with early repolarization. Other workup involved: troponin level which was negative, UDS which was positive for BZD and Marijuana, A1C 5%, , H/H and Plts within normal limits. He was given tPA as he presented within three hour window of having acute stroke symptoms from initial onset of symptoms. Additional workup involved: MRI Brain which showed mild to moderate sized left MCA distribution with acute/recent infarct of the left basal ganglia and punctate probable embolic infarction within right posterior frontal subcortical white matter. IR then performed a thrombectomy of the left M1 occlusion and established TICI flow 3. An MRA of the head and neck were done that did not show evidence for significant focal stenosis or occlusion. TTE was done that showe EF of 60-65%, no WMA and PAP of 23. A bubble study was done that was positive for intracardiac shunt. He is currently on ASA, statin, ACE-I, IVF NS and lovenox SQ injections. His BP is  being controlled as well with PRN IV labetalol, with goal systolic <140. A HILDA has been ordered for 9/19. Interventional Cardiology is being consulted for right to left cardiac shunt and evaluation for ASD/PFO closure as it is thought that patient could have a paradoxical embolic event that lead to his stroke. Presently, pt is asymptomatic and has resolution of his right sided weakness and has coherent speech.     History reviewed. No pertinent past medical history.    History reviewed. No pertinent surgical history.    Review of patient's allergies indicates:  No Known Allergies    No medications prior to admission.     Family History     None        Tobacco Use    Smoking status: Unknown If Ever Smoked    Smokeless tobacco: Never Used   Substance and Sexual Activity    Alcohol use: Not on file    Drug use: Not on file    Sexual activity: Not on file     Review of Systems   Constitution: Negative for weakness and malaise/fatigue.   Eyes: Negative for double vision.   Cardiovascular: Negative for chest pain, dyspnea on exertion and irregular heartbeat.   Respiratory: Negative for shortness of breath.    Gastrointestinal: Negative for nausea and vomiting.   Genitourinary: Negative for dysuria.   Neurological: Negative for dizziness, focal weakness, light-headedness, seizures and tremors.     Objective:     Vital Signs (Most Recent):  Temp: 98.7 °F (37.1 °C) (09/18/18 1551)  Pulse: (!) 56 (09/18/18 1602)  Resp: 18 (09/18/18 1551)  BP: 138/75 (09/18/18 1551)  SpO2: 97 % (09/18/18 1551) Vital Signs (24h Range):  Temp:  [98.5 °F (36.9 °C)-99.3 °F (37.4 °C)] 98.7 °F (37.1 °C)  Pulse:  [53-64] 56  Resp:  [16-20] 18  SpO2:  [93 %-98 %] 97 %  BP: (125-155)/(58-95) 138/75     Weight: 99.1 kg (218 lb 7.6 oz)  Body mass index is 29.63 kg/m².    SpO2: 97 %  O2 Device (Oxygen Therapy): room air      Intake/Output Summary (Last 24 hours) at 9/18/2018 2733  Last data filed at 9/18/2018 1400  Gross per 24 hour   Intake 1405 ml    Output 200 ml   Net 1205 ml       Lines/Drains/Airways     Peripheral Intravenous Line                 Peripheral IV - Single Lumen 09/16/18 Left Antecubital 2 days         Peripheral IV - Single Lumen 09/16/18 Right Hand 2 days                Physical Exam   Constitutional: He is oriented to person, place, and time. He appears well-developed and well-nourished. No distress.   HENT:   Head: Normocephalic and atraumatic.   Mouth/Throat: Oropharynx is clear and moist.   Eyes: Pupils are equal, round, and reactive to light.   Neck: Neck supple. No JVD present.   Cardiovascular: Normal rate, regular rhythm, normal heart sounds and intact distal pulses. Exam reveals no gallop and no friction rub.   No murmur heard.  Pulmonary/Chest: Effort normal and breath sounds normal.   Abdominal: Soft. Bowel sounds are normal.   Musculoskeletal: Normal range of motion. He exhibits no edema.   Neurological: He is alert and oriented to person, place, and time. No cranial nerve deficit. Coordination normal.   Skin: Skin is warm and dry. He is not diaphoretic.   Psychiatric: He has a normal mood and affect. His behavior is normal.   Vitals reviewed.      Significant Labs:   BMP:   Recent Labs   Lab  09/17/18   0405  09/18/18   0417   GLU  103  86   NA  138  135*   K  4.0  3.8   CL  105  104   CO2  25  24   BUN  9  10   CREATININE  1.2  1.3   CALCIUM  9.1  8.8   MG  2.2  2.0     Recent Labs   Lab  09/18/18   0417   WBC  9.36   RBC  4.51*   HGB  14.1   HCT  42.6   PLT  311   MCV  95   MCH  31.3*   MCHC  33.1     Significant Imaging: Echocardiogram:   2D echo with color flow doppler:   Results for orders placed or performed during the hospital encounter of 09/16/18   Echo doppler color flow   Result Value Ref Range    EF 60 55 - 65    Diastolic Dysfunction No     Est. PA Systolic Pressure 23.43      Bubble Study  CONCLUSIONS     1 - Normal left ventricular systolic function (EF 55-60%).     2 - No wall motion abnormalities.     3 -  Positive bubble study with intracardiac shunt, bubbles visible in left chambers within 3 and 4 beats on multiple injections.       Assessment and Plan:     Interatrial cardiac shunt    -pt presented with embolic stroke, w/u was negative for high grade atherosclerotic stenosis of cerebral arteries and atrial fibrillation (not seen on EKG or telemetry)   -pt had TTE with bubble study that was positive for intracardiac shunt; PAP not elevated  -pt will have HILDA tomorrow to better clarify shunt  -given that pt could have had a paradoxical emboli, would recommend that he undergo shunt closure however will need to wait at least a month; pt will be booked for an outpatient Interventional Cardiology follow up to discuss this           VTE Risk Mitigation (From admission, onward)        Ordered     enoxaparin injection 40 mg  Daily      09/18/18 1453     Place sequential compression device  Until discontinued      09/16/18 0502        Thank you for your consult.     Aminta Adan MD  Interventional Cardiology   [facility]

## 2018-09-18 NOTE — PROGRESS NOTES
[facility]  Vascular Neurology  Comprehensive Stroke Center  Progress Note    Assessment/Plan:     * Cerebrovascular accident (CVA) due to embolism of left middle cerebral artery    44 yo male with HTN and smoker presented with right sided weakness found to have L MCA occlusion s/p tPA and IR thrombectomy. Currently has no neurological deficits.     2D echo with bubble demonstrated right to left shunt concerning for ASD vs PFO. IR consult for evaluation of closure, likely source of embolic stroke.     Antithrombotics for secondary stroke prevention: Antiplatelets: Aspirin: 81 mg daily    Statins for secondary stroke prevention and hyperlipidemia, if present:   Statins: Atorvastatin- 40 mg daily    Aggressive risk factor modification: HTN, Smoking     Rehab efforts: PT/OT    Diagnostics ordered/pending: CT scan of head without contrast to asses brain parenchyma, MRA head to assess vasculature, MRA neck/arch to assess vasculature, TTE to assess cardiac function/status     VTE prophylaxis: Heparin 5000 units SQ every 8 hours    BP parameters: Infarct: Post sucessful thrombectomy, SBP <140     Will have Interventional Cardiology evaluate patient for possible closure. HILDA ordered to better visualize shunt ie ASD vs PFO.     Patient has no insurance and lives in [place]. Given young age and stroke, will benefit from in-patient evaluation and intervention.       Essential hypertension    -Stroke risk factor.  Patient without previous diagnosis or treatment.  Initial SBP at .  SBP on arrival to this facility 170s.  -SBP<140  -Started on Lisinopril 10 mg, daily      Cytotoxic cerebral edema    -Area of cytotoxic cerebral edema identified when reviewing brain imaging in the territory of the left middle cerebral artery. There is no mass effect associated with it. We will continue to monitor the patients clinical exam for any worsening of symptoms which may indicate expansion of the stroke or the area of  "the edema resulting in the clinical change. The pattern is suggestive of embolic etiology      S/P admn tPA in diff fac w/n last 24 hr bef adm to crnt fac    -Admitted to [facility] for close monitoring.         Patient came in with right sided weakness/aphasia MRI brain showing "Mild moderate sized left MCA distribution acute/recent infarction primarily within the left basal ganglia. Punctate probable embolic infarction within the right posterior frontal subcortical white matter." no vascular stenosis on CTA head & Neck.Patient is s/p tPA administration and IR thrombectomy, currently with no neurological deficits. On Aspirin and Atorvastatin. Also started on Lisinopril 10 mg daily. 2D echo with bubble shows some evidence of right to left cardiac shunt unclear if ASD vs PFO. Interventional cardiology consult in for evaluation of closure.         STROKE DOCUMENTATION   Acute Stroke Times   Last Known Normal Date: 09/15/18  Last Known Normal Time: 2300  Symptom Onset Date: 09/15/18  Symptom Onset Time: 2330  Stroke Team Called Date: 09/16/18  Stroke Team Called Time: 0125  Stroke Team Arrival Date: 09/16/18  Stroke Team Arrival Time: 0130  CT Interpretation Time: 0039(no acute abnormality)  Decision to Treat Time for Alteplase: 0040(administered prior to telestroke consult)  Decision to Treat Time for IR: 0414(taken to IR for possible thrombectomy)    NIH Scale:  1a. Level Of Consciousness: 0-->Alert: keenly responsive  1b. LOC Questions: 0-->Answers both questions correctly  1c. LOC Commands: 0-->Performs both tasks correctly  2. Best Gaze: 0-->Normal  3. Visual: 0-->No visual loss  4. Facial Palsy: 0-->Normal symmetrical movements  5a. Motor Arm, Left: 0-->No drift: limb holds 90 (or 45) degrees for full 10 secs  5b. Motor Arm, Right: 0-->No drift: limb holds 90 (or 45) degrees for full 10 secs  6a. Motor Leg, Left: 0-->No drift: leg holds 30 degree position for full 5 secs  6b. Motor Leg, Right: 0-->No drift: leg holds 30 " degree position for full 5 secs  7. Limb Ataxia: 0-->Absent  8. Sensory: 0-->Normal: no sensory loss  9. Best Language: 0-->No aphasia: normal  10. Dysarthria: 0-->Normal  11. Extinction and Inattention (formerly Neglect): 0-->No abnormality  Total (NIH Stroke Scale): 0       Modified Coats Score: 0  Durand Coma Scale:    ABCD2 Score:    [id number] Score:   HAS -BLED Score:   ICH Score:   Hunt & Hernandez Classification:      Hemorrhagic change of an Ischemic Stroke: Does this patient have an ischemic stroke with hemorrhagic changes? No         No past medical history on file.  No past surgical history on file.  No family history on file.  Social History     Tobacco Use    Smoking status: Not on file   Substance Use Topics    Alcohol use: Not on file    Drug use: Not on file     Review of patient's allergies indicates:  No Known Allergies    Medications: I have reviewed the current medication administration record.    No medications prior to admission.     Review of Systems   Constitutional: Negative for fatigue and fever.   Eyes: Negative for photophobia and visual disturbance.   Respiratory: Negative for chest tightness and shortness of breath.    Cardiovascular: Negative for chest pain, palpitations and leg swelling.   Gastrointestinal: Negative for abdominal distention, abdominal pain, diarrhea, nausea and vomiting.   Genitourinary: Negative for dysuria, frequency and urgency.   Musculoskeletal: Negative for arthralgias and back pain.   Skin: Negative for color change and pallor.   Neurological: Negative for dizziness, syncope, light-headedness and headaches.   Psychiatric/Behavioral: Negative for agitation and confusion.     Objective:     Vital Signs (Most Recent):  Temp: 98.5 °F (36.9 °C) (09/16/18 1901)  Pulse: 67 (09/16/18 2000)  Resp: (!) 32 (09/16/18 2000)  BP: (!) 127/59 (09/16/18 2000)  SpO2: 96 % (09/16/18 2000)    Vital Signs Range (Last 24H):  Temp:  [97.8 °F (36.6 °C)-98.5 °F (36.9 °C)]   Pulse:   []   Resp:  [10-46]   BP: (109-173)/()   SpO2:  [90 %-99 %]     Physical Exam   Constitutional: He is oriented to person, place, and time. He appears well-developed and well-nourished.   HENT:   Head: Normocephalic and atraumatic.   Eyes: EOM are normal. Pupils are equal, round, and reactive to light.   Neck: Normal range of motion. Neck supple. No JVD present.   Cardiovascular: Normal rate and regular rhythm. Exam reveals no friction rub.   No murmur heard.  Pulmonary/Chest: Effort normal and breath sounds normal. He has no wheezes. He has no rales.   Abdominal: Soft. Bowel sounds are normal. He exhibits no distension. There is no tenderness.   Musculoskeletal: Normal range of motion. He exhibits no edema, tenderness or deformity.   Neurological: He is alert and oriented to person, place, and time. No cranial nerve deficit.   Skin: Skin is warm. Capillary refill takes less than 2 seconds. No erythema.     Neurological Exam:   LOC: alert  Attention Span: Good   Language: No aphasia  Orientation: Person, Place, Time   Visual Fields: Full  EOM (CN III, IV, VI): Full/intact  Pupils (CN II, III): PERRL  Facial Sensation (CN V): Normal  Facial Movement (CN VII): Symmetric facial expression    Reflexes: 2+ throughout  Cebellar: No evidence of appendicular or axial ataxia  Sensation: Intact to light touch, temperature and vibration    Laboratory:  CMP:   Recent Labs   Lab  09/18/18   0417   CALCIUM  8.8   ALBUMIN  3.4*   PROT  6.5   NA  135*   K  3.8   CO2  24   CL  104   BUN  10   CREATININE  1.3   ALKPHOS  70   ALT  23   AST  24   BILITOT  1.1*     CBC:   Recent Labs   Lab  09/18/18   0417   WBC  9.36   RBC  4.51*   HGB  14.1   HCT  42.6   PLT  311   MCV  95   MCH  31.3*   MCHC  33.1       Roosevelt Contreras MD  Comprehensive Stroke Center  Department of Vascular Neurology   [facility]

## 2018-09-18 NOTE — PLAN OF CARE
Medications have been sent to pharmacy for bedside delivery. Cost of meds $75.56, cost transfer form has been faxed to pharmacy. Bedside delivery has been scheduled. Notified nurse Wale.

## 2018-09-18 NOTE — PROGRESS NOTES
09/18/18 0310   Vital Signs   Pulse (!) 54   Heart Rate Source Monitor   SpO2 95 %   Pulse Oximetry Type Intermittent   O2 Device (Oxygen Therapy) room air   BP (!) 125/58   MAP (mmHg) 83   BP Location Right arm   BP Method Automatic   Patient Position Lying     HR 47-54 on telemetry with increased T wave elevation since beginning of shift. Pt resting in bed without complaints. STAT EKG orders placed. ASHWIN Ramirez notified.

## 2018-09-18 NOTE — ASSESSMENT & PLAN NOTE
-Stroke risk factor.  Patient without previous diagnosis or treatment.  Initial SBP at .  SBP on arrival to this facility 170s.  -SBP<140  -Started on Lisinopril 10 mg, daily

## 2018-09-18 NOTE — ASSESSMENT & PLAN NOTE
44 yo male with HTN and smoker presented with right sided weakness found to have L MCA occlusion s/p tPA and IR thrombectomy. Currently has no neurological deficits. 2D echo with bubble demonstrated right to left shunt concerning for ASD vs PFO. IR consult for evaluation of closure.      Antithrombotics for secondary stroke prevention: Antiplatelets: Aspirin: 81 mg daily    Statins for secondary stroke prevention and hyperlipidemia, if present:   Statins: Atorvastatin- 40 mg daily    Aggressive risk factor modification: HTN, Smoking     Rehab efforts: PT/OT    Diagnostics ordered/pending: CT scan of head without contrast to asses brain parenchyma, MRA head to assess vasculature, MRA neck/arch to assess vasculature, TTE to assess cardiac function/status     VTE prophylaxis: Heparin 5000 units SQ every 8 hours    BP parameters: Infarct: Post sucessful thrombectomy, SBP <140     Will have Interventional Cardiology evaluate patient for possible closure. HILDA ordered to better visualize shunt ie ASD vs PFO.     Patient has no insurance and lives in [place]. Given young age and stroke, will benefit from in-patient evaluation and intervention.

## 2018-09-18 NOTE — HPI
44 yo M with no PMHx who presents with acute onset of aphasia and right sided weakness. Patient went to OSH and found to have clinically a left sided MCA stroke. Head CT non-contrast was done which was negative. EKG revealed NSR with early repolarization. Other workup involved: troponin level which was negative, UDS which was positive for BZD and Marijuana, A1C 5%, , H/H and Plts within normal limits. He was given tPA as he presented within three hour window of having acute stroke symptoms from initial onset of symptoms. Additional workup involved: MRI Brain which showed mild to moderate sized left MCA distribution with acute/recent infarct of the left basal ganglia and punctate probable embolic infarction within right posterior frontal subcortical white matter. IR then performed a thrombectomy of the left M1 occlusion and established TICI flow 3. An MRA of the head and neck were done that did not show evidence for significant focal stenosis or occlusion. TTE was done that showe EF of 60-65%, no WMA and PAP of 23. A bubble study was done that was positive for intracardiac shunt. He is currently on ASA, statin, ACE-I, IVF NS and lovenox SQ injections. His BP is being controlled as well with PRN IV labetalol, with goal systolic <140. A HILDA has been ordered for 9/19. Interventional Cardiology is being consulted for right to left cardiac shunt and evaluation for ASD/PFO closure as it is thought that patient could have a paradoxical embolic event that lead to his stroke. Presently, pt is asymptomatic and has resolution of his right sided weakness and has coherent speech.

## 2018-09-18 NOTE — TELEPHONE ENCOUNTER
----- Message from Bridgette Pedroza RN sent at 9/18/2018 12:18 PM CDT -----  Neuro Follow-up. Please schedule an appt for 4-6 weeks. Patient was inpatient and seen by Dr Funk. Please contact patient with appt date and time.

## 2018-09-18 NOTE — PROGRESS NOTES
Patient was consented for the Stroke Recovery Navigator research study. We went over all the key points of the consent. Patient was able to ask questions and all questions were answered. The patient signed the consent and then given a copy to go home with.

## 2018-09-19 ENCOUNTER — ANESTHESIA (OUTPATIENT)
Dept: MEDSURG UNIT | Facility: HOSPITAL | Age: 43
DRG: 023 | End: 2018-09-19

## 2018-09-19 LAB
DEPRECATED CARDIOLIPIN IGA SER: <9 APL
DIASTOLIC DYSFUNCTION: NO
F2 GENE MUT ANL BLD/T: NORMAL
F5 P.R506Q BLD/T QL: NORMAL
GLOBAL PERICARDIAL EFFUSION: NORMAL
MITRAL VALVE MOBILITY: NORMAL
POCT GLUCOSE: 119 MG/DL (ref 70–110)
POCT GLUCOSE: 75 MG/DL (ref 70–110)
RETIRED EF AND QEF - SEE NOTES: 60 (ref 55–65)

## 2018-09-19 PROCEDURE — 97116 GAIT TRAINING THERAPY: CPT

## 2018-09-19 PROCEDURE — 25000003 PHARM REV CODE 250: Performed by: PSYCHIATRY & NEUROLOGY

## 2018-09-19 PROCEDURE — 92507 TX SP LANG VOICE COMM INDIV: CPT

## 2018-09-19 PROCEDURE — 37000009 HC ANESTHESIA EA ADD 15 MINS

## 2018-09-19 PROCEDURE — 97535 SELF CARE MNGMENT TRAINING: CPT

## 2018-09-19 PROCEDURE — A4216 STERILE WATER/SALINE, 10 ML: HCPCS | Performed by: NURSE PRACTITIONER

## 2018-09-19 PROCEDURE — 93320 DOPPLER ECHO COMPLETE: CPT | Mod: 26,,, | Performed by: INTERNAL MEDICINE

## 2018-09-19 PROCEDURE — 63600175 PHARM REV CODE 636 W HCPCS: Performed by: NURSE ANESTHETIST, CERTIFIED REGISTERED

## 2018-09-19 PROCEDURE — D9220A PRA ANESTHESIA: Mod: ,,, | Performed by: ANESTHESIOLOGY

## 2018-09-19 PROCEDURE — 93312 ECHO TRANSESOPHAGEAL: CPT

## 2018-09-19 PROCEDURE — 93325 DOPPLER ECHO COLOR FLOW MAPG: CPT | Mod: 26,,, | Performed by: INTERNAL MEDICINE

## 2018-09-19 PROCEDURE — 37000008 HC ANESTHESIA 1ST 15 MINUTES

## 2018-09-19 PROCEDURE — 25000003 PHARM REV CODE 250: Performed by: NURSE PRACTITIONER

## 2018-09-19 PROCEDURE — 63600175 PHARM REV CODE 636 W HCPCS: Performed by: STUDENT IN AN ORGANIZED HEALTH CARE EDUCATION/TRAINING PROGRAM

## 2018-09-19 PROCEDURE — 20600001 HC STEP DOWN PRIVATE ROOM

## 2018-09-19 PROCEDURE — 93312 ECHO TRANSESOPHAGEAL: CPT | Mod: 26,,, | Performed by: INTERNAL MEDICINE

## 2018-09-19 PROCEDURE — 99233 SBSQ HOSP IP/OBS HIGH 50: CPT | Mod: ,,, | Performed by: PSYCHIATRY & NEUROLOGY

## 2018-09-19 PROCEDURE — 25000003 PHARM REV CODE 250: Performed by: NURSE ANESTHETIST, CERTIFIED REGISTERED

## 2018-09-19 RX ORDER — FENTANYL CITRATE 50 UG/ML
25 INJECTION, SOLUTION INTRAMUSCULAR; INTRAVENOUS EVERY 5 MIN PRN
Status: DISCONTINUED | OUTPATIENT
Start: 2018-09-19 | End: 2018-09-20

## 2018-09-19 RX ORDER — SODIUM CHLORIDE 9 MG/ML
INJECTION, SOLUTION INTRAVENOUS CONTINUOUS PRN
Status: DISCONTINUED | OUTPATIENT
Start: 2018-09-19 | End: 2018-09-19

## 2018-09-19 RX ORDER — LIDOCAINE HCL/PF 100 MG/5ML
SYRINGE (ML) INTRAVENOUS
Status: DISCONTINUED | OUTPATIENT
Start: 2018-09-19 | End: 2018-09-19

## 2018-09-19 RX ORDER — FENTANYL CITRATE 50 UG/ML
INJECTION, SOLUTION INTRAMUSCULAR; INTRAVENOUS
Status: DISCONTINUED | OUTPATIENT
Start: 2018-09-19 | End: 2018-09-19

## 2018-09-19 RX ORDER — PROPOFOL 10 MG/ML
VIAL (ML) INTRAVENOUS CONTINUOUS PRN
Status: DISCONTINUED | OUTPATIENT
Start: 2018-09-19 | End: 2018-09-19

## 2018-09-19 RX ORDER — DIPHENHYDRAMINE HYDROCHLORIDE 50 MG/ML
25 INJECTION INTRAMUSCULAR; INTRAVENOUS EVERY 6 HOURS PRN
Status: DISCONTINUED | OUTPATIENT
Start: 2018-09-19 | End: 2018-09-21 | Stop reason: HOSPADM

## 2018-09-19 RX ORDER — PROPOFOL 10 MG/ML
VIAL (ML) INTRAVENOUS
Status: DISCONTINUED | OUTPATIENT
Start: 2018-09-19 | End: 2018-09-19

## 2018-09-19 RX ORDER — MIDAZOLAM HYDROCHLORIDE 1 MG/ML
INJECTION, SOLUTION INTRAMUSCULAR; INTRAVENOUS
Status: DISCONTINUED | OUTPATIENT
Start: 2018-09-19 | End: 2018-09-19

## 2018-09-19 RX ADMIN — ENOXAPARIN SODIUM 40 MG: 40 INJECTION, SOLUTION INTRAVENOUS; SUBCUTANEOUS at 05:09

## 2018-09-19 RX ADMIN — Medication: at 05:09

## 2018-09-19 RX ADMIN — MIDAZOLAM 2 MG: 1 INJECTION INTRAMUSCULAR; INTRAVENOUS at 12:09

## 2018-09-19 RX ADMIN — FENTANYL CITRATE 50 MCG: 50 INJECTION, SOLUTION INTRAMUSCULAR; INTRAVENOUS at 01:09

## 2018-09-19 RX ADMIN — LIDOCAINE HYDROCHLORIDE 60 MG: 20 INJECTION, SOLUTION INTRAVENOUS at 01:09

## 2018-09-19 RX ADMIN — ATORVASTATIN CALCIUM 40 MG: 20 TABLET, FILM COATED ORAL at 09:09

## 2018-09-19 RX ADMIN — PROPOFOL 50 MG: 10 INJECTION, EMULSION INTRAVENOUS at 01:09

## 2018-09-19 RX ADMIN — SODIUM CHLORIDE: 0.9 INJECTION, SOLUTION INTRAVENOUS at 12:09

## 2018-09-19 RX ADMIN — LISINOPRIL 10 MG: 10 TABLET ORAL at 09:09

## 2018-09-19 RX ADMIN — PROPOFOL 150 MCG/KG/MIN: 10 INJECTION, EMULSION INTRAVENOUS at 01:09

## 2018-09-19 RX ADMIN — ASPIRIN 81 MG CHEWABLE TABLET 81 MG: 81 TABLET CHEWABLE at 09:09

## 2018-09-19 NOTE — PROGRESS NOTES
Patient admitted to recovery see [facility] for complete assessment pacu bcg's maintained safety measures verified patient instructed on pain scale and patient verbalized understanding.also called waiting room for patient's family and no one there at this time.

## 2018-09-19 NOTE — PLAN OF CARE
Problem: Patient Care Overview  Goal: Plan of Care Review  Outcome: Ongoing (interventions implemented as appropriate)  Patient and significant other at bedside. He has no complaints of pain. No deficits noted at this time. He is able to make his needs known, appears to be in no distress and vital signs stable. Noted bradycardia, asymptomatic.. Will continue to monitor.

## 2018-09-19 NOTE — H&P
[facility]  Cardiology  History and Physical     Patient Name: Flakito Felder  MRN: [id number]  Admission Date: 9/16/2018  Code Status: Full Code   Attending Provider: Tony Bhatt MD   Primary Care Physician: Primary Doctor No  Principal Problem:Cerebrovascular accident (CVA) due to embolism of left middle cerebral artery    Patient information was obtained from patient and ER records.     Subjective:     Chief Complaint:  CVA      HPI:  42 yo M , HILDA requested to evaluate intracardiac shunt.  Presents with a left sided MCA stroke. He was given tPA as he presented within three hour window of having acute stroke symptoms from initial onset of symptoms. Additional workup involved: MRI Brain which showed mild to moderate sized left MCA distribution with acute/recent infarct of the left basal ganglia and punctate probable embolic infarction within right posterior frontal subcortical white matter. IR then performed a thrombectomy of the left M1 occlusion and established TICI flow 3. An MRA of the head and neck were done that did not show evidence for significant focal stenosis or occlusion. TTE was done that showe EF of 60-65%, no WMA and PAP of 23. A bubble study was done that was positive for intracardiac shunt.     TTE  CONCLUSIONS     1 - Normal left ventricular systolic function (EF 55-60%).     2 - No wall motion abnormalities.     3 - Positive bubble study with intracardiac shunt, bubbles visible in left chambers within 3 and 4 beats on multiple injections.      Dysphagia or odynophagia:  No  Liver Disease, esophageal disease, or known varices:  No  Upper GI Bleeding: No  Snoring:  yes  Sleep Apnea:  No  Prior neck surgery or radiation:  No  History of anesthetic difficulties:  No  Family history of anesthetic difficulties:  No  Last oral intake:  12 hours ago  Able to move neck in all directions:  Yes        History reviewed. No pertinent past medical history.    History reviewed. No pertinent  surgical history.    Review of patient's allergies indicates:  No Known Allergies    No current facility-administered medications on file prior to encounter.      No current outpatient medications on file prior to encounter.     Family History     None        Tobacco Use    Smoking status: Unknown If Ever Smoked    Smokeless tobacco: Never Used   Substance and Sexual Activity    Alcohol use: Not on file    Drug use: Not on file    Sexual activity: Not on file     Review of Systems   All other systems reviewed and are negative.    Objective:     Vital Signs (Most Recent):  Temp: 98.4 °F (36.9 °C) (09/19/18 0903)  Pulse: (!) 54 (09/19/18 0903)  Resp: 18 (09/19/18 0903)  BP: (!) 150/92 (09/19/18 0903)  SpO2: 98 % (09/19/18 0903) Vital Signs (24h Range):  Temp:  [97.9 °F (36.6 °C)-98.7 °F (37.1 °C)] 98.4 °F (36.9 °C)  Pulse:  [] 54  Resp:  [16-18] 18  SpO2:  [97 %-100 %] 98 %  BP: (132-153)/(65-92) 150/92     Weight: 99.1 kg (218 lb 7.6 oz)  Body mass index is 29.63 kg/m².    SpO2: 98 %  O2 Device (Oxygen Therapy): room air      Intake/Output Summary (Last 24 hours) at 9/19/2018 1032  Last data filed at 9/19/2018 0400  Gross per 24 hour   Intake 1120 ml   Output --   Net 1120 ml       Lines/Drains/Airways     Peripheral Intravenous Line                 Peripheral IV - Single Lumen 09/16/18 Right Hand 3 days                Physical Exam   Constitutional: He is oriented to person, place, and time. He appears well-developed and well-nourished.   HENT:   Head: Normocephalic and atraumatic.   Eyes: No scleral icterus.   Cardiovascular: Normal rate, regular rhythm and normal heart sounds.   Pulmonary/Chest: Effort normal and breath sounds normal. He has no rales.   Musculoskeletal: He exhibits no edema.   Neurological: He is alert and oriented to person, place, and time.   Skin: Skin is warm.       Significant Labs: All pertinent lab results from the last 24 hours have been reviewed.    Significant Imaging: X-Ray:  CXR: X-Ray Chest 1 View (CXR):   Results for orders placed or performed during the hospital encounter of 09/16/18   X-Ray Chest AP Single View    Narrative    EXAMINATION:  XR CHEST 1 VIEW    CLINICAL HISTORY:  stroke;    TECHNIQUE:  Single frontal view of the chest was performed.    COMPARISON:  None.    FINDINGS:  Mediastinal structures are midline.  Hilar contours are normal.  Cardiac silhouette is normal in size.  Lung volumes are normal and symmetric.  No consolidation.  No pneumothorax or pleural effusions.  No free air beneath the diaphragm.  No acute osseous abnormalities.  Soft tissues are unremarkable.      Impression    1. No acute radiographic findings in the chest on this single view.      Electronically signed by: Rex Nagel MD  Date:    09/16/2018  Time:    07:26   EKG: nSR normal intervals and normal axis, early repolarization    Assessment and Plan:     Interatrial cardiac shunt      1. HILDA for evaluation of intracardiac shunt     -The risks, benefits & alternatives of the procedure were explained to the patient.    -The risks of transesophageal echo include but are not limited to:  Dental trauma, esophageal trauma/perforation, bleeding, laryngospasm/brochospasm, aspiration, sore throat/hoarseness, & dislodgement of the endotracheal tube/nasogastric tube (where applicable).    -The risks of moderate sedation include hypotension, respiratory depression, arrhythmias, bronchospasm, & death.    -Informed consent was obtained & the patient is agreeable to proceed with the procedure.    I will discuss with the attending physician. Attending addendum is to follow.     Further recommendations per attending addendum              VTE Risk Mitigation (From admission, onward)        Ordered     enoxaparin injection 40 mg  Daily      09/18/18 1453     Place sequential compression device  Until discontinued      09/16/18 7212          Jacek Dennis MD  Cardiology   [facility]

## 2018-09-19 NOTE — PT/OT/SLP PROGRESS
"Speech Language Pathology Treatment    Patient Name:  Flakito Felder   MRN:  [id number]  Admitting Diagnosis: Cerebrovascular accident (CVA) due to embolism of left middle cerebral artery    Recommendations:                 General Recommendations:  Dysphagia therapy and Cognitive-linguistic therapy  Diet recommendations:  Regular, Liquid Diet Level: Thin   Aspiration Precautions: Standard aspiration precautions   General Precautions: Standard, aspiration, fall  Communication strategies:  go to room if call light pushed    Subjective     "My memory is one hundred but then some stuff get me."   Patient goals: to go home    Pain/Comfort:  · Pain Rating 1: 0/10  · Pain Rating Post-Intervention 1: 0/10    Objective:     Has the patient been evaluated by SLP for swallowing?   Yes  Keep patient NPO? No   Current Respiratory Status: room air      Pt seen seated upright in chair with spouse present for ongoing cognitive linguistic tx. No po trials administered at this date to assess swallowing 2/2 npo for procedure this pm. Pt AOX4 ind'ly. Further assessment of reading, writing and visual spatial completed at this date. Pt completed clock drawing, demonstrating difficulty with organization and planning. Pt with inappropriate placement and spacing of numbers and hands initially, however, pt was able to self correct and recognize deficits. Pt completed word deduction task with 100% accy with occasional cues. Pt completed 3-component mental manipulation task with 100% accy ind'ly. Pt and spouse educated on role of SLP, cognitive linguistic deficits post CVA, and POC. Both verbalized understanding, however, recommend reinforcement to ensure carryover. Pt left in NAD with call button within reach.   Whiteboard updated.     Assessment:     lFakito Felder is a 43 y.o. male with an SLP diagnosis of Dysphagia and Cognitive-Linguistic Impairment.      Goals:   Multidisciplinary Problems     SLP Goals        Problem: SLP Goal    Goal Priority " Disciplines Outcome   SLP Goal     SLP Ongoing (interventions implemented as appropriate)   Description:  Speech Language Pathology Goals  Goals expected to be met by 9/24  1. Ongoing swallow assessment/met  2. SLP Speech/Language/Cognitive Evaluation /met  3.  Assess reading, writing visual spatial skills  4.  [place] to time and place  5.  Respond to mental manipulation tasks with 90% accuracy  6.  [place]spond to functional math and time calculations with 90% accuracy  7/  Respond to abstract problem solving tasks with 90% accuracy                    Plan:     · Patient to be seen:  5 x/week   · Plan of Care expires:  10/15/18  · Plan of Care reviewed with:  patient, spouse   · SLP Follow-Up:  Yes       Discharge recommendations:  outpatient speech therapy       Time Tracking:     SLP Treatment Date:   09/19/18  Speech Start Time:  1120  Speech Stop Time:  1138     Speech Total Time (min):  18 min    Billable Minutes: Speech Therapy Individual 10 and Seld Care/Home Management Training 8     Carmela Shea MRafaelS., CCC-SLP  Speech Language Pathologist  [phone] - pager   9/19/2018      Carmela Shea, MS CCC-SLP  09/19/2018

## 2018-09-19 NOTE — PLAN OF CARE
Problem: Physical Therapy Goal  Goal: Physical Therapy Goal  Goals to be met by: 9/28/2018    Patient will increase functional independence with mobility by performing:    Supine <> sit with Supervision.- MET  Sit <> stand transfer with Supervision using No Assistive Device. - MET  Bed <> chair transfer via Stand Pivot with Supervision using No Assistive Device. - MET  Gait  x 150 feet with Supervision using No Assistive Device to prepare for community ambulation and endurance activities. - MET  - Gait x 300 feet with independence and good R foot clearance to improve safety with community mobility.   Dynamic standing for 10 minutes with Stand-by Assistance using No Assistive Device to prepare for functional tasks in standing.  Pt will be compliant with RLE DF exercises 30x/ea to improve endurance and RLE clearance during gait. (instructed 9/19)       Outcome: Ongoing (interventions implemented as appropriate)  Patient participated well in therapy.  POC and goals remain appropriate.  Please refer to the progress note for functional mobility.     Pt is safe to perform gait in the saravia with 1 person supervision (family or nursing).     Mari Wilson, PT  9/19/2018  [phone] (pager)

## 2018-09-19 NOTE — PLAN OF CARE
Problem: Occupational Therapy Goal  Goal: Occupational Therapy Goal  Goals set 9/18 to be addressed for 7 days with expiration date, 9/25:  Patient will increase functional independence with ADLs by performing:  Patient will demonstrate grooming while standing with modified independence.   Not met  Patient will demonstrate upper body dressing with modified independence while seated EOB.   Not met  Patient will demonstrate lower body dressing with modified independence while seated EOB.   Not met  Patient will demonstrate toileting with modified independence.   Not met  Patient will demonstrate bathing while seated EOB with modified independence.   Not met  Patient will demonstrate ability to follow 5/5 commands.   Not met  Patient will demonstrate independence with HEP for FMC right UE.    Not met  Patient and/or patient's family will verbalize understanding of stroke prevention guidelines, personal risk factors and stroke warning signs via teachback method.  Not met           Outcome: Outcome(s) achieved Date Met: 09/19/18  Goals achieved.  ANDREW Crump  9/19/2018

## 2018-09-19 NOTE — NURSING TRANSFER
Nursing Transfer Note      9/19/2018     Transfer To: 329    Transfer via stretcher    Transfer with cardiac monitoring    Transported by anika with ticket to ride and I called telemetry room let them know patient going back to room    Medicines sent: none    Chart send with patient: Yes    Notified: nurse and spouse in patient's room per patient's floor nurse    Patient reassessed at: see epic (date, time)    Upon arrival to floor: to room no complaints no distress noted.

## 2018-09-19 NOTE — PROGRESS NOTES
Cardiology Note    Spoke to Indira from  regarding pt and the need to start the process for pt to obtain insurance. On going efforts are being made. Will be in communication to determine inpt vs outpt timing of PFO closure.      Aminta Adan, PGY-4

## 2018-09-19 NOTE — PT/OT/SLP PROGRESS
"Occupational Therapy   Treatment/Discharge Summary    Name: Flakito Felder  MRN: [id number]  Admitting Diagnosis:  Cerebrovascular accident (CVA) due to embolism of left middle cerebral artery       Recommendations:     Discharge Recommendations: home  Discharge Equipment Recommendations:  none  Barriers to discharge:  None    Subjective   Patient:  "I was going to go home yesterday, but now I have to do that test."  Communicated with: Nurse prior to session.  Pain/Comfort:  · Pain Rating 1: 0/10  · Pain Rating Post-Intervention 1: 0/10    Patients cultural, spiritual, Caodaism conflicts given the current situation: Restorationism    Objective:     Patient found with: peripheral IV, telemetry  Girlfriend present.  General Precautions: Standard, fall   Orthopedic Precautions:N/A   Braces: N/A     Occupational Performance:    Bed Mobility:    · Patient completed Rolling/Turning to Left with  independence  · Patient completed Rolling/Turning to Right with independence  · Patient completed Scooting/Bridging with independence  · Patient completed Supine to Sit with independence  · Patient completed Sit to Supine with independence     Functional Mobility/Transfers:  · Patient completed Sit <> Stand Transfer with modified independence  with  no assistive device   · Patient completed Bed <> Chair Transfer using Stand Pivot technique with modified independence with no assistive device    Activities of Daily Living:  · Grooming: independence     · Upper Body Dressing: independence    · Lower Body Dressing: independence    · Toileting: independence      Patient left supine with all lines intact and call button in reach    [facility] 6 Click:  [facility] Total Score: 24    Treatment & Education:  Patient alert and oriented x 3; able to follow 4/4 one step commands.  Patient attentive and interactive throughout the session.  Patient able to identify 5/5 body parts.  Able to name 5/5 objects.  Able to sequence 7/7 days of the week and 12/12 months of " the year.   White board updated in patient's room.  OT asked if there were any other questions; patient/ family had no further questions.     The Barthel Index of Activities of Daily Living Score:    Bowels:  2 = continent    Bladder:  2 = continent (for over 7 days).    Groomin = independent face/hair/teeth/shaving (implements provided)    Toilet Use:  2 = independent (on and off, dressing, wiping)    Feeding : 2 = independent (food provided within reach).    Transfer: 3 = independent     Mobility: 3 = independent (but may use any aid, e.g., stick)    Dressin = independent (including buttons, zips, laces, etc.)    Stairs: 2 = independent up and down    Bathin = independent (or in shower)    Score: 20/20 (modified scoring for Barthel index)   lower scores indicating increased disability        Education:    Assessment:     Flakito Felder is a 43 y.o. male with a medical diagnosis of Cerebrovascular accident (CVA) due to embolism of left middle cerebral artery.  He presents with ability to organize occupational performance in a timely and safe manner; demonstrating skills necessary to successfully and appropriately participate in everyday tasks and social situations.   No further OT needed.        Rehab Prognosis:  Good; patient would benefit from acute skilled OT services to address these deficits and reach maximum level of function.       Plan:     Patient to be seen 4 x/week to address the above listed problems via self-care/home management, therapeutic activities, therapeutic exercises, neuromuscular re-education, cognitive retraining, sensory integration  · Plan of Care Expires: 10/15/18  · Plan of Care Reviewed with: patient, caregiver    This Plan of care has been discussed with the patient who was involved in its development and understands and is in agreement with the identified goals and treatment plan    GOALS:   Multidisciplinary Problems     Occupational Therapy Goals     Not on file           Multidisciplinary Problems (Resolved)        Problem: Occupational Therapy Goal    Goal Priority Disciplines Outcome Interventions   Occupational Therapy Goal   (Resolved)     OT, PT/OT Outcome(s) achieved    Description:  Goals set 9/18 to be addressed for 7 days with expiration date, 9/25:  Patient will increase functional independence with ADLs by performing:  Patient will demonstrate grooming while standing with modified independence.   Not met  Patient will demonstrate upper body dressing with modified independence while seated EOB.   Not met  Patient will demonstrate lower body dressing with modified independence while seated EOB.   Not met  Patient will demonstrate toileting with modified independence.   Not met  Patient will demonstrate bathing while seated EOB with modified independence.   Not met  Patient will demonstrate ability to follow 5/5 commands.   Not met  Patient will demonstrate independence with HEP for FMC right UE.    Not met  Patient and/or patient's family will verbalize understanding of stroke prevention guidelines, personal risk factors and stroke warning signs via teachback method.  Not met                            Time Tracking:     OT Date of Treatment: 09/19/18  OT Start Time: 0536  OT Stop Time: 0550  OT Total Time (min): 14 min    Billable Minutes:Self Care/Home Management 14    ANDREW Crump  9/19/2018

## 2018-09-19 NOTE — TRANSFER OF CARE
Anesthesia Transfer of Care Note    Patient: Flakito Felder    Procedure(s) Performed: Procedure(s) (LRB):  ECHOCARDIOGRAM,TRANSESOPHAGEAL (N/A)    Patient location: PACU    Anesthesia Type: general    Transport from OR: Transported from OR on room air with adequate spontaneous ventilation    Post pain: adequate analgesia    Post assessment: no apparent anesthetic complications and tolerated procedure well    Post vital signs: stable    Level of consciousness: awake, alert and oriented    Nausea/Vomiting: no nausea/vomiting    Complications: none    Transfer of care protocol was followed      Last vitals:   Visit Vitals  /73 (BP Location: Left arm, Patient Position: Sitting)   Pulse 64   Temp 36.7 °C (98 °F) (Oral)   Resp 18   Ht 6' (1.829 m)   Wt 99.1 kg (218 lb 7.6 oz)   SpO2 97%   BMI 29.63 kg/m²

## 2018-09-19 NOTE — SUBJECTIVE & OBJECTIVE
History reviewed. No pertinent past medical history.    History reviewed. No pertinent surgical history.    Review of patient's allergies indicates:  No Known Allergies    No current facility-administered medications on file prior to encounter.      No current outpatient medications on file prior to encounter.     Family History     None        Tobacco Use    Smoking status: Unknown If Ever Smoked    Smokeless tobacco: Never Used   Substance and Sexual Activity    Alcohol use: Not on file    Drug use: Not on file    Sexual activity: Not on file     Review of Systems   All other systems reviewed and are negative.    Objective:     Vital Signs (Most Recent):  Temp: 98.4 °F (36.9 °C) (09/19/18 0903)  Pulse: (!) 54 (09/19/18 0903)  Resp: 18 (09/19/18 0903)  BP: (!) 150/92 (09/19/18 0903)  SpO2: 98 % (09/19/18 0903) Vital Signs (24h Range):  Temp:  [97.9 °F (36.6 °C)-98.7 °F (37.1 °C)] 98.4 °F (36.9 °C)  Pulse:  [] 54  Resp:  [16-18] 18  SpO2:  [97 %-100 %] 98 %  BP: (132-153)/(65-92) 150/92     Weight: 99.1 kg (218 lb 7.6 oz)  Body mass index is 29.63 kg/m².    SpO2: 98 %  O2 Device (Oxygen Therapy): room air      Intake/Output Summary (Last 24 hours) at 9/19/2018 1032  Last data filed at 9/19/2018 0400  Gross per 24 hour   Intake 1120 ml   Output --   Net 1120 ml       Lines/Drains/Airways     Peripheral Intravenous Line                 Peripheral IV - Single Lumen 09/16/18 Right Hand 3 days                Physical Exam   Constitutional: He is oriented to person, place, and time. He appears well-developed and well-nourished.   HENT:   Head: Normocephalic and atraumatic.   Eyes: No scleral icterus.   Cardiovascular: Normal rate, regular rhythm and normal heart sounds.   Pulmonary/Chest: Effort normal and breath sounds normal. He has no rales.   Musculoskeletal: He exhibits no edema.   Neurological: He is alert and oriented to person, place, and time.   Skin: Skin is warm.       Significant Labs: All pertinent  lab results from the last 24 hours have been reviewed.    Significant Imaging: X-Ray: CXR: X-Ray Chest 1 View (CXR):   Results for orders placed or performed during the hospital encounter of 09/16/18   X-Ray Chest AP Single View    Narrative    EXAMINATION:  XR CHEST 1 VIEW    CLINICAL HISTORY:  stroke;    TECHNIQUE:  Single frontal view of the chest was performed.    COMPARISON:  None.    FINDINGS:  Mediastinal structures are midline.  Hilar contours are normal.  Cardiac silhouette is normal in size.  Lung volumes are normal and symmetric.  No consolidation.  No pneumothorax or pleural effusions.  No free air beneath the diaphragm.  No acute osseous abnormalities.  Soft tissues are unremarkable.      Impression    1. No acute radiographic findings in the chest on this single view.      Electronically signed by: Rex Nagel MD  Date:    09/16/2018  Time:    07:26   EKG: nSR normal intervals and normal axis, early repolarization

## 2018-09-19 NOTE — PLAN OF CARE
Patient seen and examined at bedside  Spoke with him at length about the procedure we would be looking to do pending the results of his HILDA including access and complications (picture drawn).  All questions answered  Consents not yet obtained as we are pending an answer on his insurance status  He is currently uninsured and was explained that, depending upon whether or not he can acquire some form of insurance on working with social work, our plan would change accordingly.    I have spoken with primary team as well and will await further information from them.    Curtis Evans MD  PGY-V Cardiology Fellow  [phone]

## 2018-09-19 NOTE — PLAN OF CARE
Problem: Patient Care Overview  Goal: Plan of Care Review  Outcome: Ongoing (interventions implemented as appropriate)  Pt had HILDA today and confirmed a right to left shunt is his heart, surgery to follow. Pt progressed post procedure from liquids to regular cardiac diet. Mother and spouse at bedside. No falls, VSS. HR runs lois as low as 36 at night but pt is asymptomatic. BG within normal range. Will continue to monitor.

## 2018-09-19 NOTE — ASSESSMENT & PLAN NOTE
1. HILDA for evaluation of intracardiac shunt     -The risks, benefits & alternatives of the procedure were explained to the patient.    -The risks of transesophageal echo include but are not limited to:  Dental trauma, esophageal trauma/perforation, bleeding, laryngospasm/brochospasm, aspiration, sore throat/hoarseness, & dislodgement of the endotracheal tube/nasogastric tube (where applicable).    -The risks of moderate sedation include hypotension, respiratory depression, arrhythmias, bronchospasm, & death.    -Informed consent was obtained & the patient is agreeable to proceed with the procedure.    I will discuss with the attending physician. Attending addendum is to follow.     Further recommendations per attending addendum

## 2018-09-19 NOTE — PT/OT/SLP PROGRESS
"Physical Therapy Treatment    Patient Name:  Flakito Felder   MRN:  [id number]    Recommendations:     Discharge Recommendations:  outpatient PT   Discharge Equipment Recommendations: none   Barriers to discharge: None    Plan:     During this hospitalization, patient to be seen 3 x/week to address the above listed problems via gait training, neuromuscular re-education, therapeutic activities, therapeutic exercises  · Plan of Care Expires:  10/15/18   Plan of Care Reviewed with: patient, mother, significant other    Subjective     Communicated with RN prior to session.  Patient found supine in bed upon PT entry to room, agreeable to treatment.      Chief Complaint: none stated  Patient comments/goals: "You mean I can sit up in the chair!  The bed is like kryptonite to superman."  Pain/Comfort:  ·  no pain    Objective:     Patient found with: peripheral IV, telemetry     General Precautions: Standard, aspiration, fall   Patient was found supine in bed in NAD. He agreed to therapy.  Supine to sit independent. Sit to stand independent.  Balance and gait testing performed.  Gait x 150 feet SBA with no assistive device, decreased RLE clearance d/t decreased DF and foot flat initial contact.  However, 5/5 DF strength bilaterally.      TINETTI BALANCE ASSESSMENT TOOL  Assistive Device  Normally Used: No Assistive Device During Testing: No   Balance Section: (Patient is seated in hard, armless chair)  1.  Sitting Balance: Steady; safe = 1  2.  Rises from chair: Able, without using arms = 2  3.  Attempts to arise: Able to arise, 1 attempt = 2  4.  Immediate standing Balance (first 5 seconds): Steady without walker or other support = 2  5.  Standing balance: Narrow stance without support = 2  6.  Nudged: Steady = 2  7.  Eyes closed: Steady = 1  8.  Turning 360 degrees: Continuous = 1 and Steady = 1  9.  Sitting Down: Safe, smooth motion = 2   Gait Section: (Patient stands with therapist, walks across room at usual pace, then " again at a rapid pace.)  10.  Initiation of Gait (Immediately after told to go.): No hesitancy = 1  11.  Step length and height:  Step through R=1 and Step through L=1  12.  Foot Clearance: L foot clears floor=1   13.  Step symmetry: Right & Left step length appear equal = 1  14.  Step continuity: Steps appear continuous = 1  15.  Path: Straight without walking aid = 2  16.  Trunk: No sway, no flexion, no use of arms, and no use of walking aid = 2  17.  Walking Time: Heels amost touching while walking = 1  Balance Score:  16/16   Gait Score: 11/12  Total Score (balance + gait): 27/28   ?18 = High risk of falls   19-23 = Moderate risk of falls   ?24 = Low risk of falls    AM-PAC 6 CLICK MOBILITY  Turning over in bed (including adjusting bedclothes, sheets and blankets)?: 4  Sitting down on and standing up from a chair with arms (e.g., wheelchair, bedside commode, etc.): 4  Moving from lying on back to sitting on the side of the bed?: 4  Moving to and from a bed to a chair (including a wheelchair)?: 4  Need to walk in hospital room?: 4  Climbing 3-5 steps with a railing?: 4  Basic Mobility Total Score: 24       Therapeutic Activities and Exercises:   PT educated patient in risk for falls d/t R foot droop (minor) but expected to worsen with fatigue in a community environment.  PT educated patient in DF exercises 30x/ea to improve muscular endurance.    Patient left up in chair with all lines intact, call button in reach, RN notified and girlfriend present..    GOALS:   Multidisciplinary Problems     Physical Therapy Goals        Problem: Physical Therapy Goal    Goal Priority Disciplines Outcome Goal Variances Interventions   Physical Therapy Goal     PT, PT/OT Ongoing (interventions implemented as appropriate)     Description:  Goals to be met by: 9/28/2018    Patient will increase functional independence with mobility by performing:    Supine <> sit with Supervision.- MET  Sit <> stand transfer with Supervision  using No Assistive Device. - MET  Bed <> chair transfer via Stand Pivot with Supervision using No Assistive Device. - MET  Gait  x 150 feet with Supervision using No Assistive Device to prepare for community ambulation and endurance activities. - MET  - Gait x 300 feet with independence and good R foot clearance to improve safety with community mobility.   Dynamic standing for 10 minutes with Stand-by Assistance using No Assistive Device to prepare for functional tasks in standing.  Pt will be compliant with RLE DF exercises 30x/ea to improve endurance and RLE clearance during gait. (instructed 9/19)                       Assessment:     Flakito Felder is a 43 y.o. male admitted with a medical diagnosis of Cerebrovascular accident (CVA) due to embolism of left middle cerebral artery.  He presents with the following impairments/functional limitations:   Gait deviation with impairment.  He has improved since the initial evaluation. He is now ambulating without assistance and without an assistive device.  He is safe to ambulate in saravia with 1 person assistance.  He will require OP PT to address gait deviation.  At this time he is not at significant risk for falls, Tinetti score 27/28.      Rehab Prognosis:  excellent; patient would benefit from acute skilled PT services to address these deficits and reach maximum level of function.      Recent Surgery: Procedure(s) (LRB):  ECHOCARDIOGRAM,TRANSESOPHAGEAL (N/A)        Time Tracking:     PT Received On: 09/19/18  PT Start Time: 0955     PT Stop Time: 1010  PT Total Time (min): 15 min     Billable Minutes: Gait Training 15    Treatment Type: Treatment  PT/PTA: PT         Mari Wilson, PT  9/19/2018  [phone] (pager)

## 2018-09-19 NOTE — HPI
44 yo M , HILDA requested to evaluate intracardiac shunt.  Presents with a left sided MCA stroke. He was given tPA as he presented within three hour window of having acute stroke symptoms from initial onset of symptoms. Additional workup involved: MRI Brain which showed mild to moderate sized left MCA distribution with acute/recent infarct of the left basal ganglia and punctate probable embolic infarction within right posterior frontal subcortical white matter. IR then performed a thrombectomy of the left M1 occlusion and established TICI flow 3. An MRA of the head and neck were done that did not show evidence for significant focal stenosis or occlusion. TTE was done that showe EF of 60-65%, no WMA and PAP of 23. A bubble study was done that was positive for intracardiac shunt.     TTE  CONCLUSIONS     1 - Normal left ventricular systolic function (EF 55-60%).     2 - No wall motion abnormalities.     3 - Positive bubble study with intracardiac shunt, bubbles visible in left chambers within 3 and 4 beats on multiple injections.      Dysphagia or odynophagia:  No  Liver Disease, esophageal disease, or known varices:  No  Upper GI Bleeding: No  Snoring:  yes  Sleep Apnea:  No  Prior neck surgery or radiation:  No  History of anesthetic difficulties:  No  Family history of anesthetic difficulties:  No  Last oral intake:  12 hours ago  Able to move neck in all directions:  Yes

## 2018-09-19 NOTE — PROGRESS NOTES
[facility]  Vascular Neurology  Comprehensive Stroke Center  Progress Note    Assessment/Plan:     * Cerebrovascular accident (CVA) due to embolism of left middle cerebral artery    44 yo male with HTN and smoker presented with right sided weakness found to have L MCA occlusion s/p tPA and IR thrombectomy. Currently has no neurological deficits. 2D echo with bubble demonstrated right to left shunt concerning for ASD vs PFO. IR consult for evaluation of closure.      Antithrombotics for secondary stroke prevention: Antiplatelets: Aspirin: 81 mg daily    Statins for secondary stroke prevention and hyperlipidemia, if present:   Statins: Atorvastatin- 40 mg daily    Aggressive risk factor modification: HTN, Smoking     Rehab efforts: PT/OT    Diagnostics ordered/pending: CT scan of head without contrast to asses brain parenchyma, MRA head to assess vasculature, MRA neck/arch to assess vasculature, TTE to assess cardiac function/status     VTE prophylaxis: Lovenox 40 mg daily     BP parameters: Infarct: Post sucessful thrombectomy, SBP <140     Will have Interventional Cardiology evaluate patient for possible closure. HILDA results pending.       Patient has no insurance and lives in [place]. Given young age and stroke, will benefit from in-patient evaluation and intervention.       Essential hypertension    -Stroke risk factor.  Patient without previous diagnosis or treatment.  Initial SBP at .  SBP on arrival to this facility 170s.  -SBP<140  -Started on Lisinopril 10 mg, daily      Cytotoxic cerebral edema    -Area of cytotoxic cerebral edema identified when reviewing brain imaging in the territory of the left middle cerebral artery. There is no mass effect associated with it. We will continue to monitor the patients clinical exam for any worsening of symptoms which may indicate expansion of the stroke or the area of the edema resulting in the clinical change. The pattern is suggestive of embolic  "etiology      S/P admn tPA in diff fac w/n last 24 hr bef adm to crnt fac    -Admitted to [facility] for close monitoring.         Patient came in with right sided weakness/aphasia MRI brain showing "Mild moderate sized left MCA distribution acute/recent infarction primarily within the left basal ganglia. Punctate probable embolic infarction within the right posterior frontal subcortical white matter." no vascular stenosis on CTA head & Neck.Patient is s/p tPA administration and IR thrombectomy, currently with no neurological deficits. On Aspirin and Atorvastatin. Also started on Lisinopril 10 mg daily. 2D echo with bubble shows some evidence of right to left cardiac shunt unclear if ASD vs PFO. Interventional cardiology consult in for evaluation of closure. HILDA today done. Pending results.       STROKE DOCUMENTATION   Acute Stroke Times   Last Known Normal Date: 09/15/18  Last Known Normal Time: 2300  Symptom Onset Date: 09/15/18  Symptom Onset Time: 2330  Stroke Team Called Date: 09/16/18  Stroke Team Called Time: 0125  Stroke Team Arrival Date: 09/16/18  Stroke Team Arrival Time: 0130  CT Interpretation Time: 0039(no acute abnormality)  Decision to Treat Time for Alteplase: 0040(administered prior to telestroke consult)  Decision to Treat Time for IR: 0414(taken to IR for possible thrombectomy)    NIH Scale:  1a. Level Of Consciousness: 0-->Alert: keenly responsive  1b. LOC Questions: 0-->Answers both questions correctly  1c. LOC Commands: 0-->Performs both tasks correctly  2. Best Gaze: 0-->Normal  3. Visual: 0-->No visual loss  4. Facial Palsy: 0-->Normal symmetrical movements  5a. Motor Arm, Left: 0-->No drift: limb holds 90 (or 45) degrees for full 10 secs  5b. Motor Arm, Right: 0-->No drift: limb holds 90 (or 45) degrees for full 10 secs  6a. Motor Leg, Left: 0-->No drift: leg holds 30 degree position for full 5 secs  6b. Motor Leg, Right: 0-->No drift: leg holds 30 degree position for full 5 secs  7. Limb Ataxia: " 0-->Absent  9. Best Language: 0-->No aphasia: normal  10. Dysarthria: 0-->Normal  11. Extinction and Inattention (formerly Neglect): 0-->No abnormality       Modified [place] Score: 0  [place] Coma Scale:    ABCD2 Score:    [id number] Score:   HAS -BLED Score:   ICH Score:   Hunt & Hernandez Classification:      Hemorrhagic change of an Ischemic Stroke: Does this patient have an ischemic stroke with hemorrhagic changes? No     Neurologic Chief Complaint: Left MCA stroke     Subjective:     Interval History: HILDA today. Anticipate  shunt closure in pt rather than op given pt's insurance issues and lives far away.     HPI, Past Medical, Family, and Social History remains the same as documented in the initial encounter.     Review of Systems   Constitutional: Negative for fatigue and fever.   Eyes: Negative for photophobia and visual disturbance.   Respiratory: Negative for chest tightness and shortness of breath.    Cardiovascular: Negative for chest pain, palpitations and leg swelling.   Gastrointestinal: Negative for abdominal distention, abdominal pain, diarrhea, nausea and vomiting.   Genitourinary: Negative for dysuria, frequency and urgency.   Musculoskeletal: Negative for arthralgias and back pain.   Skin: Negative for color change and pallor.   Neurological: Negative for dizziness, syncope, light-headedness and headaches.   Psychiatric/Behavioral: Negative for agitation and confusion.     Scheduled Meds:   aspirin  81 mg Oral Daily    atorvastatin  40 mg Oral Daily    enoxaparin  40 mg Subcutaneous Daily    lisinopril  10 mg Oral Daily    sodium chloride 0.9%  3 mL Intravenous Q8H     Continuous Infusions:  PRN Meds:acetaminophen, ondansetron, sodium chloride 0.9%    Objective:     Vital Signs (Most Recent):  Temp: 98 °F (36.7 °C) (09/19/18 1207)  Pulse: 64 (09/19/18 1207)  Resp: 18 (09/19/18 1207)  BP: 134/73 (09/19/18 1207)  SpO2: 97 % (09/19/18 1207)  BP Location: Left arm    Vital Signs Range (Last  24H):  Temp:  [97.9 °F (36.6 °C)-98.7 °F (37.1 °C)]   Pulse:  []   Resp:  [16-18]   BP: (132-150)/(65-92)   SpO2:  [97 %-100 %]   BP Location: Left arm    Physical Exam   Constitutional: He is oriented to person, place, and time. He appears well-developed and well-nourished.   HENT:   Head: Normocephalic and atraumatic.   Eyes: EOM are normal. Pupils are equal, round, and reactive to light.   Neck: Normal range of motion. Neck supple. No JVD present.   Cardiovascular: Normal rate and regular rhythm. Exam reveals no friction rub.   No murmur heard.  Pulmonary/Chest: Effort normal and breath sounds normal. He has no wheezes. He has no rales.   Abdominal: Soft. Bowel sounds are normal. He exhibits no distension. There is no tenderness.   Musculoskeletal: Normal range of motion. He exhibits no edema, tenderness or deformity.   Neurological: He is alert and oriented to person, place, and time. No cranial nerve deficit.   Skin: Skin is warm. Capillary refill takes less than 2 seconds. No erythema.     Neurological Exam:   LOC: alert  Attention Span: Good   Language: No aphasia  Orientation: Person, Place, Time   Visual Fields: Full  EOM (CN III, IV, VI): Full/intact  Pupils (CN II, III): PERRL  Facial Sensation (CN V): Normal  Facial Movement (CN VII): Symmetric facial expression    Reflexes: 2+ throughout  Cebellar: No evidence of appendicular or axial ataxia  Sensation: Intact to light touch, temperature and vibration    Laboratory:  CMP: No results for input(s): GLUCOSE, CALCIUM, ALBUMIN, PROT, NA, K, CO2, CL, BUN, CREATININE, ALKPHOS, ALT, AST, BILITOT in the last 24 hours.  CBC:   Recent Labs   Lab  09/18/18   0417   WBC  9.36   RBC  4.51*   HGB  14.1   HCT  42.6   PLT  311   MCV  95   MCH  31.3*   MCHC  33.1       Roosevelt Contreras MD  [facility] Stroke [facility]  Department of Vascular Neurology   [facility]

## 2018-09-19 NOTE — ASSESSMENT & PLAN NOTE
42 yo male with HTN and smoker presented with right sided weakness found to have L MCA occlusion s/p tPA and IR thrombectomy. Currently has no neurological deficits. 2D echo with bubble demonstrated right to left shunt concerning for ASD vs PFO. IR consult for evaluation of closure.      Antithrombotics for secondary stroke prevention: Antiplatelets: Aspirin: 81 mg daily    Statins for secondary stroke prevention and hyperlipidemia, if present:   Statins: Atorvastatin- 40 mg daily    Aggressive risk factor modification: HTN, Smoking     Rehab efforts: PT/OT    Diagnostics ordered/pending: CT scan of head without contrast to asses brain parenchyma, MRA head to assess vasculature, MRA neck/arch to assess vasculature, TTE to assess cardiac function/status     VTE prophylaxis: Lovenox 40 mg daily     BP parameters: Infarct: Post sucessful thrombectomy, SBP <140     Will have Interventional Cardiology evaluate patient for possible closure. HILDA results pending.       Patient has no insurance and lives in [place]. Given young age and stroke, will benefit from in-patient evaluation and intervention.

## 2018-09-19 NOTE — ANESTHESIA POSTPROCEDURE EVALUATION
Anesthesia Post Evaluation    Patient: Flakito Felder    Procedure(s) Performed: Procedure(s) (LRB):  ECHOCARDIOGRAM,TRANSESOPHAGEAL (N/A)    Final Anesthesia Type: general  Patient location during evaluation: telemetry step down  Patient participation: Yes- Able to Participate  Level of consciousness: awake and alert  Post-procedure vital signs: reviewed and stable  Pain management: adequate  Airway patency: patent  PONV status at discharge: No PONV  Anesthetic complications: no      Cardiovascular status: hemodynamically stable  Respiratory status: unassisted  Hydration status: euvolemic  Follow-up not needed.        Visit Vitals  /81 (BP Location: Right arm, Patient Position: Lying)   Pulse (!) 56   Temp 36.7 °C (98.1 °F) (Oral)   Resp 18   Ht 6' (1.829 m)   Wt 99.1 kg (218 lb 7.6 oz)   SpO2 95%   BMI 29.63 kg/m²       Pain/Yolanda Score: Pain Assessment Performed: Yes (9/19/2018  4:15 PM)  Presence of Pain: denies (9/19/2018  4:15 PM)  Yolanda Score: 10 (9/19/2018  2:10 PM)

## 2018-09-19 NOTE — PLAN OF CARE
Problem: SLP Goal  Goal: SLP Goal  Speech Language Pathology Goals  Goals expected to be met by 9/24  1. Ongoing swallow assessment/met  2. SLP Speech/Language/Cognitive Evaluation /met  3.  Assess reading, writing visual spatial skills  4.  [place] to time and place  5.  Respond to mental manipulation tasks with 90% accuracy  6.  REspond to functional math and time calculations with 90% accuracy  7/  Respond to abstract problem solving tasks with 90% accuracy   Outcome: Ongoing (interventions implemented as appropriate)      Pt seen for ongoing cognitive lingusitic tx. ST to continue to follow according to POC.     Carmela Shea M.S., CCC-SLP  Speech Language Pathologist  [phone] - pager   9/19/2018

## 2018-09-19 NOTE — SUBJECTIVE & OBJECTIVE
Neurologic Chief Complaint: Left MCA stroke     Subjective:     Interval History: HILDA today. Anticipate  shunt closure in pt rather than op given pt's insurance issues and lives far away.     HPI, Past Medical, Family, and Social History remains the same as documented in the initial encounter.     Review of Systems   Constitutional: Negative for fatigue and fever.   Eyes: Negative for photophobia and visual disturbance.   Respiratory: Negative for chest tightness and shortness of breath.    Cardiovascular: Negative for chest pain, palpitations and leg swelling.   Gastrointestinal: Negative for abdominal distention, abdominal pain, diarrhea, nausea and vomiting.   Genitourinary: Negative for dysuria, frequency and urgency.   Musculoskeletal: Negative for arthralgias and back pain.   Skin: Negative for color change and pallor.   Neurological: Negative for dizziness, syncope, light-headedness and headaches.   Psychiatric/Behavioral: Negative for agitation and confusion.     Scheduled Meds:   aspirin  81 mg Oral Daily    atorvastatin  40 mg Oral Daily    enoxaparin  40 mg Subcutaneous Daily    lisinopril  10 mg Oral Daily    sodium chloride 0.9%  3 mL Intravenous Q8H     Continuous Infusions:  PRN Meds:acetaminophen, ondansetron, sodium chloride 0.9%    Objective:     Vital Signs (Most Recent):  Temp: 98 °F (36.7 °C) (09/19/18 1207)  Pulse: 64 (09/19/18 1207)  Resp: 18 (09/19/18 1207)  BP: 134/73 (09/19/18 1207)  SpO2: 97 % (09/19/18 1207)  BP Location: Left arm    Vital Signs Range (Last 24H):  Temp:  [97.9 °F (36.6 °C)-98.7 °F (37.1 °C)]   Pulse:  []   Resp:  [16-18]   BP: (132-150)/(65-92)   SpO2:  [97 %-100 %]   BP Location: Left arm    Physical Exam   Constitutional: He is oriented to person, place, and time. He appears well-developed and well-nourished.   HENT:   Head: Normocephalic and atraumatic.   Eyes: EOM are normal. Pupils are equal, round, and reactive to light.   Neck: Normal range of motion.  Neck supple. No JVD present.   Cardiovascular: Normal rate and regular rhythm. Exam reveals no friction rub.   No murmur heard.  Pulmonary/Chest: Effort normal and breath sounds normal. He has no wheezes. He has no rales.   Abdominal: Soft. Bowel sounds are normal. He exhibits no distension. There is no tenderness.   Musculoskeletal: Normal range of motion. He exhibits no edema, tenderness or deformity.   Neurological: He is alert and oriented to person, place, and time. No cranial nerve deficit.   Skin: Skin is warm. Capillary refill takes less than 2 seconds. No erythema.     Neurological Exam:   LOC: alert  Attention Span: Good   Language: No aphasia  Orientation: Person, Place, Time   Visual Fields: Full  EOM (CN III, IV, VI): Full/intact  Pupils (CN II, III): PERRL  Facial Sensation (CN V): Normal  Facial Movement (CN VII): Symmetric facial expression    Reflexes: 2+ throughout  Cebellar: No evidence of appendicular or axial ataxia  Sensation: Intact to light touch, temperature and vibration    Laboratory:  CMP: No results for input(s): GLUCOSE, CALCIUM, ALBUMIN, PROT, NA, K, CO2, CL, BUN, CREATININE, ALKPHOS, ALT, AST, BILITOT in the last 24 hours.  CBC:   Recent Labs   Lab  09/18/18   0417   WBC  9.36   RBC  4.51*   HGB  14.1   HCT  42.6   PLT  311   MCV  95   MCH  31.3*   MCHC  33.1

## 2018-09-20 PROBLEM — Z92.82 S/P ADMN TPA IN DIFF FAC W/N LAST 24 HR BEF ADM TO CRNT FAC: Status: RESOLVED | Noted: 2018-09-16 | Resolved: 2018-09-20

## 2018-09-20 LAB
ABO + RH BLD: NORMAL
ALBUMIN SERPL BCP-MCNC: 3.8 G/DL
ALP SERPL-CCNC: 81 U/L
ALT SERPL W/O P-5'-P-CCNC: 23 U/L
ANION GAP SERPL CALC-SCNC: 8 MMOL/L
APTT PROTEIN C ACTIVATOR+FV DP/APTT PPP: 96 %
AST SERPL-CCNC: 28 U/L
AT III ACT/NOR PPP CHRO: 99 %
BASOPHILS # BLD AUTO: 0.06 K/UL
BASOPHILS NFR BLD: 0.7 %
BILIRUB SERPL-MCNC: 0.9 MG/DL
BLD GP AB SCN CELLS X3 SERPL QL: NORMAL
BUN SERPL-MCNC: 15 MG/DL
CALCIUM SERPL-MCNC: 9.8 MG/DL
CHLORIDE SERPL-SCNC: 103 MMOL/L
CO2 SERPL-SCNC: 29 MMOL/L
CREAT SERPL-MCNC: 1.4 MG/DL
DIFFERENTIAL METHOD: NORMAL
EOSINOPHIL # BLD AUTO: 0.3 K/UL
EOSINOPHIL NFR BLD: 3.4 %
ERYTHROCYTE [DISTWIDTH] IN BLOOD BY AUTOMATED COUNT: 13.9 %
EST. GFR  (AFRICAN AMERICAN): >60 ML/MIN/1.73 M^2
EST. GFR  (NON AFRICAN AMERICAN): >60 ML/MIN/1.73 M^2
GLUCOSE SERPL-MCNC: 75 MG/DL
HCT VFR BLD AUTO: 45 %
HGB BLD-MCNC: 14.5 G/DL
IMM GRANULOCYTES # BLD AUTO: 0.02 K/UL
IMM GRANULOCYTES NFR BLD AUTO: 0.2 %
LYMPHOCYTES # BLD AUTO: 3 K/UL
LYMPHOCYTES NFR BLD: 33.8 %
MAGNESIUM SERPL-MCNC: 2.2 MG/DL
MCH RBC QN AUTO: 30.5 PG
MCHC RBC AUTO-ENTMCNC: 32.2 G/DL
MCV RBC AUTO: 95 FL
MONOCYTES # BLD AUTO: 0.7 K/UL
MONOCYTES NFR BLD: 8 %
NEUTROPHILS # BLD AUTO: 4.8 K/UL
NEUTROPHILS NFR BLD: 53.9 %
NRBC BLD-RTO: 0 /100 WBC
PHOSPHATE SERPL-MCNC: 3.6 MG/DL
PLATELET # BLD AUTO: 330 K/UL
PMV BLD AUTO: 10.8 FL
POCT GLUCOSE: 91 MG/DL (ref 70–110)
POCT GLUCOSE: 99 MG/DL (ref 70–110)
POTASSIUM SERPL-SCNC: 3.9 MMOL/L
PROT S ACT/NOR PPP: >130 %
PROT SERPL-MCNC: 7.3 G/DL
RBC # BLD AUTO: 4.75 M/UL
SODIUM SERPL-SCNC: 140 MMOL/L
WBC # BLD AUTO: 8.91 K/UL

## 2018-09-20 PROCEDURE — 25000003 PHARM REV CODE 250: Performed by: STUDENT IN AN ORGANIZED HEALTH CARE EDUCATION/TRAINING PROGRAM

## 2018-09-20 PROCEDURE — 99233 SBSQ HOSP IP/OBS HIGH 50: CPT | Mod: ,,, | Performed by: PSYCHIATRY & NEUROLOGY

## 2018-09-20 PROCEDURE — 63600175 PHARM REV CODE 636 W HCPCS

## 2018-09-20 PROCEDURE — 25000003 PHARM REV CODE 250

## 2018-09-20 PROCEDURE — 99152 MOD SED SAME PHYS/QHP 5/>YRS: CPT | Mod: ,,, | Performed by: INTERNAL MEDICINE

## 2018-09-20 PROCEDURE — 83735 ASSAY OF MAGNESIUM: CPT

## 2018-09-20 PROCEDURE — 93580 TRANSCATH CLOSURE OF ASD: CPT | Mod: ,,, | Performed by: INTERNAL MEDICINE

## 2018-09-20 PROCEDURE — 25000003 PHARM REV CODE 250: Performed by: NURSE PRACTITIONER

## 2018-09-20 PROCEDURE — 85025 COMPLETE CBC W/AUTO DIFF WBC: CPT

## 2018-09-20 PROCEDURE — C1893 INTRO/SHEATH, FIXED,NON-PEEL: HCPCS

## 2018-09-20 PROCEDURE — 02U53JZ SUPPLEMENT ATRIAL SEPTUM WITH SYNTHETIC SUBSTITUTE, PERCUTANEOUS APPROACH: ICD-10-PCS | Performed by: INTERNAL MEDICINE

## 2018-09-20 PROCEDURE — 86850 RBC ANTIBODY SCREEN: CPT

## 2018-09-20 PROCEDURE — 84100 ASSAY OF PHOSPHORUS: CPT

## 2018-09-20 PROCEDURE — C1769 GUIDE WIRE: HCPCS

## 2018-09-20 PROCEDURE — 25000003 PHARM REV CODE 250: Performed by: INTERNAL MEDICINE

## 2018-09-20 PROCEDURE — 80053 COMPREHEN METABOLIC PANEL: CPT

## 2018-09-20 PROCEDURE — A4216 STERILE WATER/SALINE, 10 ML: HCPCS | Performed by: NURSE PRACTITIONER

## 2018-09-20 PROCEDURE — 20600001 HC STEP DOWN PRIVATE ROOM

## 2018-09-20 PROCEDURE — 25000003 PHARM REV CODE 250: Performed by: PSYCHIATRY & NEUROLOGY

## 2018-09-20 PROCEDURE — 36415 COLL VENOUS BLD VENIPUNCTURE: CPT

## 2018-09-20 RX ORDER — DIPHENHYDRAMINE HCL 50 MG
50 CAPSULE ORAL EVERY 6 HOURS
Status: ACTIVE | OUTPATIENT
Start: 2018-09-20 | End: 2018-09-21

## 2018-09-20 RX ORDER — CLOPIDOGREL BISULFATE 75 MG/1
75 TABLET ORAL DAILY
Status: DISCONTINUED | OUTPATIENT
Start: 2018-09-20 | End: 2018-09-21 | Stop reason: HOSPADM

## 2018-09-20 RX ORDER — SODIUM CHLORIDE 9 MG/ML
3 INJECTION, SOLUTION INTRAVENOUS CONTINUOUS
Status: ACTIVE | OUTPATIENT
Start: 2018-09-20 | End: 2018-09-20

## 2018-09-20 RX ADMIN — ATORVASTATIN CALCIUM 40 MG: 20 TABLET, FILM COATED ORAL at 08:09

## 2018-09-20 RX ADMIN — SODIUM CHLORIDE 3 ML/KG/HR: 0.9 INJECTION, SOLUTION INTRAVENOUS at 04:09

## 2018-09-20 RX ADMIN — ASPIRIN 81 MG CHEWABLE TABLET 81 MG: 81 TABLET CHEWABLE at 08:09

## 2018-09-20 RX ADMIN — LISINOPRIL 10 MG: 10 TABLET ORAL at 08:09

## 2018-09-20 RX ADMIN — Medication 3 ML: at 04:09

## 2018-09-20 RX ADMIN — CLOPIDOGREL 75 MG: 75 TABLET, FILM COATED ORAL at 10:09

## 2018-09-20 NOTE — SUBJECTIVE & OBJECTIVE
Neurologic Chief Complaint: Right sides weakness     Subjective:     Interval History: PFO closure today.     HPI, Past Medical, Family, and Social History remains the same as documented in the initial encounter.     Review of Systems   Constitutional: Negative for fatigue and fever.   Eyes: Negative for photophobia and visual disturbance.   Respiratory: Negative for chest tightness and shortness of breath.    Cardiovascular: Negative for chest pain, palpitations and leg swelling.   Gastrointestinal: Negative for abdominal distention, abdominal pain, diarrhea, nausea and vomiting.   Genitourinary: Negative for dysuria, frequency and urgency.   Musculoskeletal: Negative for arthralgias and back pain.   Skin: Negative for color change and pallor.   Neurological: Negative for dizziness, syncope, light-headedness and headaches.   Psychiatric/Behavioral: Negative for agitation and confusion.     Scheduled Meds:   aspirin  81 mg Oral Daily    atorvastatin  40 mg Oral Daily    enoxaparin  40 mg Subcutaneous Daily    lisinopril  10 mg Oral Daily    sodium chloride 0.9%  3 mL Intravenous Q8H     Continuous Infusions:  PRN Meds:acetaminophen, diphenhydrAMINE, ondansetron, sodium chloride 0.9%    Objective:     Vital Signs (Most Recent):  Temp: 98.8 °F (37.1 °C) (09/20/18 1146)  Pulse: (!) 59 (09/20/18 1200)  Resp: 18 (09/20/18 1146)  BP: 130/68 (09/20/18 1146)  SpO2: (!) 93 % (09/20/18 1146)  BP Location: Left arm    Vital Signs Range (Last 24H):  Temp:  [98 °F (36.7 °C)-99 °F (37.2 °C)]   Pulse:  [46-67]   Resp:  [16-18]   BP: (125-145)/(60-92)   SpO2:  [93 %-100 %]   BP Location: Left arm    Physical Exam   Constitutional: He is oriented to person, place, and time. He appears well-developed and well-nourished.   HENT:   Head: Normocephalic and atraumatic.   Eyes: EOM are normal. Pupils are equal, round, and reactive to light.   Neck: Normal range of motion. Neck supple. No JVD present.   Cardiovascular: Normal rate and  regular rhythm. Exam reveals no friction rub.   No murmur heard.  Pulmonary/Chest: Effort normal and breath sounds normal. He has no wheezes. He has no rales.   Abdominal: Soft. Bowel sounds are normal. He exhibits no distension. There is no tenderness.   Musculoskeletal: Normal range of motion. He exhibits no edema, tenderness or deformity.   Neurological: He is alert and oriented to person, place, and time. No cranial nerve deficit.   Skin: Skin is warm. Capillary refill takes less than 2 seconds. No erythema.       Neurological Exam:   LOC: alert  Attention Span: Good   Language: No aphasia  Articulation: No dysarthria  Orientation: Person, Place, Time   Visual Fields: Full  EOM (CN III, IV, VI): Full/intact  Pupils (CN II, III): PERRL  Facial Sensation (CN V): Normal  Facial Movement (CN VII): Symmetric facial expression    Reflexes: 2+ throughout  Cebellar: No evidence of appendicular or axial ataxia    Laboratory:  CMP:   Recent Labs   Lab  09/20/18   0404   CALCIUM  9.8   ALBUMIN  3.8   PROT  7.3   NA  140   K  3.9   CO2  29   CL  103   BUN  15   CREATININE  1.4   ALKPHOS  81   ALT  23   AST  28   BILITOT  0.9     CBC:   Recent Labs   Lab  09/20/18   0404   WBC  8.91   RBC  4.75   HGB  14.5   HCT  45.0   PLT  330   MCV  95   MCH  30.5   MCHC  32.2

## 2018-09-20 NOTE — PROGRESS NOTES
[facility]  Vascular Neurology  Comprehensive Stroke Center  Progress Note    Assessment/Plan:     * Cerebrovascular accident (CVA) due to embolism of left middle cerebral artery    42 yo male with HTN and smoker presented with right sided weakness found to have L MCA occlusion s/p tPA and IR thrombectomy. Currently has no neurological deficits. 2D echo with bubble demonstrated right to left shunt concerning for ASD vs PFO. IR consult for evaluation of closure.      Antithrombotics for secondary stroke prevention: Antiplatelets: Aspirin: 81 mg daily    Statins for secondary stroke prevention and hyperlipidemia, if present:   Statins: Atorvastatin d/c'd LDL wnl and now we now etiology of stroke (paradoxical cardio-embolic stroke)    Aggressive risk factor modification: HTN, Smoking     Rehab efforts: PT/OT    Diagnostics ordered/pending: CT scan of head without contrast to asses brain parenchyma, MRA head to assess vasculature, MRA neck/arch to assess vasculature, TTE to assess cardiac function/status     VTE prophylaxis: Lovenox 40 mg daily     BP parameters: Infarct: Post sucessful thrombectomy, SBP <140     HILDA with evidence of PFO. Closure planned today by interventional cardiology.       Essential hypertension    -Stroke risk factor.  Patient without previous diagnosis or treatment.  Initial SBP at .  SBP on arrival to this facility 170s.  -SBP<140  -Started on Lisinopril 10 mg, daily      Cytotoxic cerebral edema    -Area of cytotoxic cerebral edema identified when reviewing brain imaging in the territory of the left middle cerebral artery. There is no mass effect associated with it. We will continue to monitor the patients clinical exam for any worsening of symptoms which may indicate expansion of the stroke or the area of the edema resulting in the clinical change. The pattern is suggestive of embolic etiology         Patient came in with right sided weakness/aphasia MRI brain  "showing "Mild moderate sized left MCA distribution acute/recent infarction primarily within the left basal ganglia. Punctate probable embolic infarction within the right posterior frontal subcortical white matter." no vascular stenosis on CTA head & Neck.Patient is s/p tPA administration and IR thrombectomy, currently with no neurological deficits. On Aspirin and Atorvastatin. Also started on Lisinopril 10 mg daily. 2D echo with bubble shows some evidence of right to left cardiac shunt unclear if ASD vs PFO. Subsequent HILDA with evidence of PFO. Closure planned today by Interventional cardiology.     STROKE DOCUMENTATION   Acute Stroke Times   Last Known Normal Date: 09/15/18  Last Known Normal Time: 2300  Symptom Onset Date: 09/15/18  Symptom Onset Time: 2330  Stroke Team Called Date: 09/16/18  Stroke Team Called Time: 0125  Stroke Team Arrival Date: 09/16/18  Stroke Team Arrival Time: 0130  CT Interpretation Time: 0039(no acute abnormality)  Decision to Treat Time for Alteplase: 0040(administered prior to telestroke consult)  Decision to Treat Time for IR: 0414(taken to IR for possible thrombectomy)    NIH Scale:  1a. Level Of Consciousness: 0-->Alert: keenly responsive  1b. LOC Questions: 0-->Answers both questions correctly  1c. LOC Commands: 0-->Performs both tasks correctly  2. Best Gaze: 0-->Normal  3. Visual: 0-->No visual loss  4. Facial Palsy: 0-->Normal symmetrical movements  5a. Motor Arm, Left: 0-->No drift: limb holds 90 (or 45) degrees for full 10 secs  5b. Motor Arm, Right: 0-->No drift: limb holds 90 (or 45) degrees for full 10 secs  6a. Motor Leg, Left: 0-->No drift: leg holds 30 degree position for full 5 secs  6b. Motor Leg, Right: 0-->No drift: leg holds 30 degree position for full 5 secs  7. Limb Ataxia: 0-->Absent  8. Sensory: 0-->Normal: no sensory loss  9. Best Language: 0-->No aphasia: normal  10. Dysarthria: 0-->Normal  11. Extinction and Inattention (formerly Neglect): 0-->No " abnormality  Total (NIH Stroke Scale): 0       Modified [place] Score: 0  [place] Coma Scale:    ABCD2 Score:    [id number] Score:   HAS -BLED Score:   ICH Score:   Hunt & Hernandez Classification:      Hemorrhagic change of an Ischemic Stroke: Does this patient have an ischemic stroke with hemorrhagic changes? No     Neurologic Chief Complaint: Right sides weakness     Subjective:     Interval History: PFO closure today.     HPI, Past Medical, Family, and Social History remains the same as documented in the initial encounter.     Review of Systems   Constitutional: Negative for fatigue and fever.   Eyes: Negative for photophobia and visual disturbance.   Respiratory: Negative for chest tightness and shortness of breath.    Cardiovascular: Negative for chest pain, palpitations and leg swelling.   Gastrointestinal: Negative for abdominal distention, abdominal pain, diarrhea, nausea and vomiting.   Genitourinary: Negative for dysuria, frequency and urgency.   Musculoskeletal: Negative for arthralgias and back pain.   Skin: Negative for color change and pallor.   Neurological: Negative for dizziness, syncope, light-headedness and headaches.   Psychiatric/Behavioral: Negative for agitation and confusion.     Scheduled Meds:   aspirin  81 mg Oral Daily    atorvastatin  40 mg Oral Daily    enoxaparin  40 mg Subcutaneous Daily    lisinopril  10 mg Oral Daily    sodium chloride 0.9%  3 mL Intravenous Q8H     Continuous Infusions:  PRN Meds:acetaminophen, diphenhydrAMINE, ondansetron, sodium chloride 0.9%    Objective:     Vital Signs (Most Recent):  Temp: 98.8 °F (37.1 °C) (09/20/18 1146)  Pulse: (!) 59 (09/20/18 1200)  Resp: 18 (09/20/18 1146)  BP: 130/68 (09/20/18 1146)  SpO2: (!) 93 % (09/20/18 1146)  BP Location: Left arm    Vital Signs Range (Last 24H):  Temp:  [98 °F (36.7 °C)-99 °F (37.2 °C)]   Pulse:  [46-67]   Resp:  [16-18]   BP: (125-145)/(60-92)   SpO2:  [93 %-100 %]   BP Location: Left arm    Physical Exam    Constitutional: He is oriented to person, place, and time. He appears well-developed and well-nourished.   HENT:   Head: Normocephalic and atraumatic.   Eyes: EOM are normal. Pupils are equal, round, and reactive to light.   Neck: Normal range of motion. Neck supple. No JVD present.   Cardiovascular: Normal rate and regular rhythm. Exam reveals no friction rub.   No murmur heard.  Pulmonary/Chest: Effort normal and breath sounds normal. He has no wheezes. He has no rales.   Abdominal: Soft. Bowel sounds are normal. He exhibits no distension. There is no tenderness.   Musculoskeletal: Normal range of motion. He exhibits no edema, tenderness or deformity.   Neurological: He is alert and oriented to person, place, and time. No cranial nerve deficit.   Skin: Skin is warm. Capillary refill takes less than 2 seconds. No erythema.       Neurological Exam:   LOC: alert  Attention Span: Good   Language: No aphasia  Articulation: No dysarthria  Orientation: Person, Place, Time   Visual Fields: Full  EOM (CN III, IV, VI): Full/intact  Pupils (CN II, III): PERRL  Facial Sensation (CN V): Normal  Facial Movement (CN VII): Symmetric facial expression    Reflexes: 2+ throughout  Cebellar: No evidence of appendicular or axial ataxia    Laboratory:  CMP:   Recent Labs   Lab  09/20/18   0404   CALCIUM  9.8   ALBUMIN  3.8   PROT  7.3   NA  140   K  3.9   CO2  29   CL  103   BUN  15   CREATININE  1.4   ALKPHOS  81   ALT  23   AST  28   BILITOT  0.9     CBC:   Recent Labs   Lab  09/20/18   0404   WBC  8.91   RBC  4.75   HGB  14.5   HCT  45.0   PLT  330   MCV  95   MCH  30.5   MCHC  32.2     Roosevelt Contreras MD  Comprehensive Stroke Center  Department of Vascular Neurology   [facility]

## 2018-09-20 NOTE — PLAN OF CARE
Problem: Patient Care Overview  Goal: Plan of Care Review  Outcome: Ongoing (interventions implemented as appropriate)  Pt verbalized understanding of plan of care. Informed patient and family of risk of falling.  Discussed Fall prevention strategies. Bed locked and low.  Call light and personal items within reach. SR up x 2.  PFO closure today.

## 2018-09-20 NOTE — ASSESSMENT & PLAN NOTE
42 yo male with HTN and smoker presented with right sided weakness found to have L MCA occlusion s/p tPA and IR thrombectomy. Currently has no neurological deficits. 2D echo with bubble demonstrated right to left shunt concerning for ASD vs PFO. IR consult for evaluation of closure.      Antithrombotics for secondary stroke prevention: Antiplatelets: Aspirin: 81 mg daily    Statins for secondary stroke prevention and hyperlipidemia, if present:   Statins: Atorvastatin d/c'd LDL wnl and now we now etiology of stroke (paradoxical cardio-embolic stroke)    Aggressive risk factor modification: HTN, Smoking     Rehab efforts: PT/OT    Diagnostics ordered/pending: CT scan of head without contrast to asses brain parenchyma, MRA head to assess vasculature, MRA neck/arch to assess vasculature, TTE to assess cardiac function/status     VTE prophylaxis: Lovenox 40 mg daily     BP parameters: Infarct: Post sucessful thrombectomy, SBP <140     HILDA with evidence of PFO. Closure planned today by interventional cardiology.

## 2018-09-20 NOTE — PROCEDURES
Post Cath Note  Referring Physician: Tony Bhatt MD  Procedure: ECHOCARDIOGRAM,TRANSESOPHAGEAL (N/A)       Access: RCFV      See full report for further details    Intervention:   RCFV access  ICE  Cardioform septal occluder placed (25mm)    Closure device: Manual pressure    Post Cath Exam:   /75 (BP Location: Left arm, Patient Position: Sitting)   Pulse 73   Temp 99.6 °F (37.6 °C) (Oral)   Resp 18   Ht 6' (1.829 m)   Wt 99.1 kg (218 lb 7.6 oz)   SpO2 97%   BMI 29.63 kg/m²   No unusual pain, hematoma, thrill or bruit at vascular access site.  Distal pulse present without signs of ischemia.    Recommendations:   - Routine post-cath care  - ASA/Plavix for 6 months  - He will need a 30 day event monitor  - Follow with Dr. Santos in 4-6 weeks.     Signed:  Donta Kelsey MD  Cardiology Fellow  Pager [phone]

## 2018-09-20 NOTE — PLAN OF CARE
Spoke with patient at bedside  Explained procedure again  Consents obtained  Will likely be done later today.  Discussed with staff and interventional fellow.    Curtis Evans MD  PGY-V Cardiology Fellow  [phone]

## 2018-09-20 NOTE — PLAN OF CARE
Problem: Patient Care Overview  Goal: Plan of Care Review  Outcome: Ongoing (interventions implemented as appropriate)  Pt free of falls and injury during shift. POC reviewed with pt. VS stable. SR/SB on telemetry.POC BG 75. No acute events noted at this time. No complaints. Family at bedside. Bed low and locked, call light with in reach. Will continue to monitor.

## 2018-09-21 VITALS
TEMPERATURE: 98 F | RESPIRATION RATE: 18 BRPM | WEIGHT: 218.5 LBS | HEART RATE: 73 BPM | OXYGEN SATURATION: 96 % | HEIGHT: 72 IN | SYSTOLIC BLOOD PRESSURE: 122 MMHG | BODY MASS INDEX: 29.59 KG/M2 | DIASTOLIC BLOOD PRESSURE: 66 MMHG

## 2018-09-21 LAB
CARDIOLIPIN IGG SER IA-ACNC: <9.4 GPL
CARDIOLIPIN IGM SER IA-ACNC: <9.4 MPL
POC ACTIVATED CLOTTING TIME K: 158 SEC (ref 74–137)
SAMPLE: ABNORMAL

## 2018-09-21 PROCEDURE — G0425 INPT/ED TELECONSULT30: HCPCS | Mod: GT,G0,, | Performed by: PSYCHIATRY & NEUROLOGY

## 2018-09-21 PROCEDURE — 97116 GAIT TRAINING THERAPY: CPT

## 2018-09-21 PROCEDURE — 25000003 PHARM REV CODE 250: Performed by: STUDENT IN AN ORGANIZED HEALTH CARE EDUCATION/TRAINING PROGRAM

## 2018-09-21 PROCEDURE — 25000003 PHARM REV CODE 250: Performed by: NURSE PRACTITIONER

## 2018-09-21 PROCEDURE — 25000003 PHARM REV CODE 250: Performed by: PSYCHIATRY & NEUROLOGY

## 2018-09-21 PROCEDURE — 99233 SBSQ HOSP IP/OBS HIGH 50: CPT | Mod: ,,, | Performed by: PSYCHIATRY & NEUROLOGY

## 2018-09-21 RX ORDER — NAPROXEN SODIUM 220 MG/1
81 TABLET, FILM COATED ORAL DAILY
Qty: 180 TABLET | Refills: 0 | COMMUNITY
Start: 2018-09-21

## 2018-09-21 RX ORDER — CLOPIDOGREL BISULFATE 75 MG/1
75 TABLET ORAL DAILY
Qty: 180 TABLET | Refills: 0 | Status: SHIPPED | OUTPATIENT
Start: 2018-09-22

## 2018-09-21 RX ORDER — LISINOPRIL 10 MG/1
10 TABLET ORAL DAILY
Qty: 90 TABLET | Refills: 3 | Status: SHIPPED | OUTPATIENT
Start: 2018-09-21

## 2018-09-21 RX ADMIN — ASPIRIN 81 MG CHEWABLE TABLET 81 MG: 81 TABLET CHEWABLE at 08:09

## 2018-09-21 RX ADMIN — LISINOPRIL 10 MG: 10 TABLET ORAL at 08:09

## 2018-09-21 RX ADMIN — CLOPIDOGREL 75 MG: 75 TABLET, FILM COATED ORAL at 08:09

## 2018-09-21 NOTE — NURSING
D/C instructions and AVS given to and reviewed with patient and wife;  Verbalized Understanding.  IV access removed, no redness or swelling noted.  Awaiting med bedside med delivery.

## 2018-09-21 NOTE — PROGRESS NOTES
[facility]  Vascular Neurology  Comprehensive Stroke Center  Progress Note    Assessment/Plan:     * Cerebrovascular accident (CVA) due to embolism of left middle cerebral artery    44 yo male with HTN and smoker presented with right sided weakness found to have L MCA occlusion s/p tPA and IR thrombectomy. Currently has no neurological deficits. 2D echo with bubble demonstrated right to left shunt concerning for ASD vs PFO. IR consult for evaluation of closure.      Antithrombotics for secondary stroke prevention: Antiplatelets: Aspirin: 81 mg and Plavix for 6 months per interventional cardiology recommendation. S/p PFO closure as evident on HILDA which is likely etiology of stroke.     Statins for secondary stroke prevention and hyperlipidemia, if present:   Statins: Atorvastatin d/c'd LDL wnl and now we now etiology of stroke (paradoxical cardio-embolic stroke)    Aggressive risk factor modification: HTN, Smoking. Currently on Lisinopril. No previous hx of HTN. Needs re-evaluation for HTN post discharge to see if he needs to be on BP meds.      Rehab efforts: PT/OT- discharged with OP PT/OT/speech     Diagnostics ordered/pending: CT scan of head without contrast to asses brain parenchyma, MRA head to assess vasculature, MRA neck/arch to assess vasculature, TTE to assess cardiac function/status     VTE prophylaxis: Lovenox 40 mg daily     BP parameters: Infarct: Post sucessful thrombectomy, SBP <140     HILDA with evidence of PFO. Closed. DAPT for 6 months. Follow up with Dr. Santos in clinic (interventional cardiology). Unable to provide event monitor per recommendation as he has no insurance.       Essential hypertension    -Stroke risk factor.  Patient without previous diagnosis or treatment.  Initial SBP at .  SBP on arrival to this facility 170s.  -SBP<140  -Started on Lisinopril 10 mg, daily  -BP has been optimal during hospitalization  -Need re-assessment if he actually needs BP meds  "post discharge. Hopefully he follows up with PCP. Pt with out insurance.       Cytotoxic cerebral edema    -Area of cytotoxic cerebral edema identified when reviewing brain imaging in the territory of the left middle cerebral artery. There is no mass effect associated with it. We will continue to monitor the patients clinical exam for any worsening of symptoms which may indicate expansion of the stroke or the area of the edema resulting in the clinical change. The pattern is suggestive of embolic etiology         Patient came in with right sided weakness/aphasia MRI brain showing "Mild moderate sized left MCA distribution acute/recent infarction primarily within the left basal ganglia. Punctate probable embolic infarction within the right posterior frontal subcortical white matter." no vascular stenosis on CTA head & Neck.Patient is s/p [facility] and IR thrombectomy here at [facility], currently with no neurological deficits up on discharge. HILDA with PFO. Closed by Interventional cardiology. Started on ASA/Plavix for 6 months. Cardiac event monitored ordered but unlikely to get due to pt not having insurance. Needs to follow up with Dr. Santos (Interventional cardiology) in 4 weeks. Needs insurance for follow up visit. Discussed with pt. Per family, they're working on acquiring one. Social work unable to get one. Pt lives in [place] and doesn't qualify for medicaid here.     STROKE DOCUMENTATION   Acute Stroke Times   Last Known Normal Date: 09/15/18  Last Known Normal Time: 2300  Symptom Onset Date: 09/15/18  Symptom Onset Time: 2330  Stroke Team Called Date: 09/16/18  Stroke Team Called Time: 0125  Stroke Team Arrival Date: 09/16/18  Stroke Team Arrival Time: 0130  CT Interpretation Time: 0039(no acute abnormality)  Decision to Treat Time for Alteplase: 0040(administered prior to telestroke consult)  Decision to Treat Time for IR: 0414(taken to IR for possible " thrombectomy)    NIH Scale:  1a. Level Of Consciousness: 0-->Alert: keenly responsive  1b. LOC Questions: 0-->Answers both questions correctly  1c. LOC Commands: 0-->Performs both tasks correctly  2. Best Gaze: 0-->Normal  3. Visual: 0-->No visual loss  4. Facial Palsy: 0-->Normal symmetrical movements  5a. Motor Arm, Left: 0-->No drift: limb holds 90 (or 45) degrees for full 10 secs  5b. Motor Arm, Right: 0-->No drift: limb holds 90 (or 45) degrees for full 10 secs  6a. Motor Leg, Left: 0-->No drift: leg holds 30 degree position for full 5 secs  6b. Motor Leg, Right: 0-->No drift: leg holds 30 degree position for full 5 secs  7. Limb Ataxia: 0-->Absent  8. Sensory: 0-->Normal: no sensory loss  9. Best Language: 0-->No aphasia: normal  10. Dysarthria: 0-->Normal  11. Extinction and Inattention (formerly Neglect): 0-->No abnormality  Total (NIH Stroke Scale): 0       Modified Bob Score: 0  [place] Coma Scale:    ABCD2 Score:    [id number] Score:   HAS -BLED Score:   ICH Score:   Hunt & Hernandez Classification:      Hemorrhagic change of an Ischemic Stroke: Does this patient have an ischemic stroke with hemorrhagic changes? No     Neurologic Chief Complaint: Paradoxical cardio-embolic L.MCA stroke.     Subjective:     Interval History: Please see hospital course. PFO closed today. No complications.     HPI, Past Medical, Family, and Social History remains the same as documented in the initial encounter.     Review of Systems   Constitutional: Negative for fatigue and fever.   Eyes: Negative for photophobia and visual disturbance.   Respiratory: Negative for chest tightness and shortness of breath.    Cardiovascular: Negative for chest pain, palpitations and leg swelling.   Gastrointestinal: Negative for abdominal distention, abdominal pain, diarrhea, nausea and vomiting.   Genitourinary: Negative for dysuria, frequency and urgency.   Musculoskeletal: Negative for arthralgias and back pain.   Skin: Negative for color  change and pallor.   Neurological: Negative for dizziness, syncope, light-headedness and headaches.   Psychiatric/Behavioral: Negative for agitation and confusion.     Scheduled Meds:  Continuous Infusions:  PRN Meds:    Objective:     Vital Signs (Most Recent):  Temp: 98.4 °F (36.9 °C) (09/21/18 1155)  Pulse: 73 (09/21/18 1155)  Resp: 18 (09/21/18 1155)  BP: 122/66 (09/21/18 1155)  SpO2: 96 % (09/21/18 1155)  BP Location: Left arm    Vital Signs Range (Last 24H):  Temp:  [97 °F (36.1 °C)-98.8 °F (37.1 °C)]   Pulse:  [49-86]   Resp:  [14-18]   BP: (122-161)/(66-95)   SpO2:  [95 %-100 %]   BP Location: Left arm    Physical Exam   Constitutional: He is oriented to person, place, and time. He appears well-developed and well-nourished.   HENT:   Head: Normocephalic and atraumatic.   Eyes: EOM are normal. Pupils are equal, round, and reactive to light.   Neck: Normal range of motion. Neck supple. No JVD present.   Cardiovascular: Normal rate and regular rhythm. Exam reveals no friction rub.   No murmur heard.  Pulmonary/Chest: Effort normal and breath sounds normal. He has no wheezes. He has no rales.   Abdominal: Soft. Bowel sounds are normal. He exhibits no distension. There is no tenderness.   Musculoskeletal: Normal range of motion. He exhibits no edema, tenderness or deformity.   Neurological: He is alert and oriented to person, place, and time. No cranial nerve deficit.   Skin: Skin is warm. Capillary refill takes less than 2 seconds. No erythema.     Neurological Exam:   LOC: alert  Attention Span: Good   Language: No aphasia  Articulation: No dysarthria  Orientation: Person, Place, Time   Visual Fields: Full  EOM (CN III, IV, VI): Full/intact  Pupils (CN II, III): PERRL  Facial Sensation (CN V): Normal  Facial Movement (CN VII): Symmetric facial expression    Reflexes: 2+ throughout  Cebellar: No evidence of appendicular or axial ataxia    Laboratory:  CMP: No results for input(s): GLUCOSE, CALCIUM, ALBUMIN,  PROT, NA, K, CO2, CL, BUN, CREATININE, ALKPHOS, ALT, AST, BILITOT in the last 24 hours.  CBC:   Recent Labs   Lab  09/20/18   0404   WBC  8.91   RBC  4.75   HGB  14.5   HCT  45.0   PLT  330   MCV  95   MCH  30.5   MCHC  32.2     Roosevelt Contreras MD  Comprehensive Stroke Center  Department of Vascular Neurology   [facility]

## 2018-09-21 NOTE — PLAN OF CARE
Patient discharging home today. Patient in agreement with discharge plan. Follow-up appts scheduled and AVS has been updated. RX med sent to outpatient pharmacy and to be delivered to bedside.        09/21/18 1227   Final Note   Assessment Type Final Discharge Note   Discharge Disposition Home   Right Care Referral Info   Post Acute Recommendation No Care

## 2018-09-21 NOTE — ASSESSMENT & PLAN NOTE
42 yo male with HTN and smoker presented with right sided weakness found to have L MCA occlusion s/p tPA and IR thrombectomy. Currently has no neurological deficits. 2D echo with bubble demonstrated right to left shunt concerning for ASD vs PFO. IR consult for evaluation of closure.      Antithrombotics for secondary stroke prevention: Antiplatelets: Aspirin: 81 mg and Plavix for 6 months per interventional cardiology recommendation. S/p PFO closure as evident on HILDA which is likely etiology of stroke.     Statins for secondary stroke prevention and hyperlipidemia, if present:   Statins: Atorvastatin d/c'd LDL wnl and now we now etiology of stroke (paradoxical cardio-embolic stroke)    Aggressive risk factor modification: HTN, Smoking. Currently on Lisinopril. No previous hx of HTN. Needs re-evaluation for HTN post discharge to see if he needs to be on BP meds.      Rehab efforts: PT/OT- discharged with OP PT/OT/speech     Diagnostics ordered/pending: CT scan of head without contrast to asses brain parenchyma, MRA head to assess vasculature, MRA neck/arch to assess vasculature, TTE to assess cardiac function/status     VTE prophylaxis: Lovenox 40 mg daily     BP parameters: Infarct: Post sucessful thrombectomy, SBP <140     HILDA with evidence of PFO. Closed. DAPT for 6 months. Follow up with Dr. Santos in clinic (interventional cardiology). Unable to provide event monitor per recommendation as he has no insurance.

## 2018-09-21 NOTE — PROGRESS NOTES
Cardiology Note    Pt seen this AM, denies neurological symptoms. Tolerating DAPT (ASA/Plavix). Pt will need to work on obtaining healthcare insurance in order to book follow up apt with [facility]. Pt should see Dr. Santos in 4 weeks. Phone # at clinic is: [phone].    Aminta Adan, PGY-4 (Cardiology Fellow)

## 2018-09-21 NOTE — ASSESSMENT & PLAN NOTE
-Stroke risk factor.  Patient without previous diagnosis or treatment.  Initial SBP at .  SBP on arrival to this facility 170s.  -SBP<140  -Started on Lisinopril 10 mg, daily  -BP has been optimal during hospitalization  -Need re-assessment if he actually needs BP meds post discharge. Hopefully he follows up with PCP. Pt with out insurance.

## 2018-09-21 NOTE — SUBJECTIVE & OBJECTIVE
Neurologic Chief Complaint: Paradoxical cardio-embolic L.MCA stroke.     Subjective:     Interval History: Please see hospital course. PFO closed today. No complications.     HPI, Past Medical, Family, and Social History remains the same as documented in the initial encounter.     Review of Systems   Constitutional: Negative for fatigue and fever.   Eyes: Negative for photophobia and visual disturbance.   Respiratory: Negative for chest tightness and shortness of breath.    Cardiovascular: Negative for chest pain, palpitations and leg swelling.   Gastrointestinal: Negative for abdominal distention, abdominal pain, diarrhea, nausea and vomiting.   Genitourinary: Negative for dysuria, frequency and urgency.   Musculoskeletal: Negative for arthralgias and back pain.   Skin: Negative for color change and pallor.   Neurological: Negative for dizziness, syncope, light-headedness and headaches.   Psychiatric/Behavioral: Negative for agitation and confusion.     Scheduled Meds:  Continuous Infusions:  PRN Meds:    Objective:     Vital Signs (Most Recent):  Temp: 98.4 °F (36.9 °C) (09/21/18 1155)  Pulse: 73 (09/21/18 1155)  Resp: 18 (09/21/18 1155)  BP: 122/66 (09/21/18 1155)  SpO2: 96 % (09/21/18 1155)  BP Location: Left arm    Vital Signs Range (Last 24H):  Temp:  [97 °F (36.1 °C)-98.8 °F (37.1 °C)]   Pulse:  [49-86]   Resp:  [14-18]   BP: (122-161)/(66-95)   SpO2:  [95 %-100 %]   BP Location: Left arm    Physical Exam   Constitutional: He is oriented to person, place, and time. He appears well-developed and well-nourished.   HENT:   Head: Normocephalic and atraumatic.   Eyes: EOM are normal. Pupils are equal, round, and reactive to light.   Neck: Normal range of motion. Neck supple. No JVD present.   Cardiovascular: Normal rate and regular rhythm. Exam reveals no friction rub.   No murmur heard.  Pulmonary/Chest: Effort normal and breath sounds normal. He has no wheezes. He has no rales.   Abdominal: Soft. Bowel sounds  are normal. He exhibits no distension. There is no tenderness.   Musculoskeletal: Normal range of motion. He exhibits no edema, tenderness or deformity.   Neurological: He is alert and oriented to person, place, and time. No cranial nerve deficit.   Skin: Skin is warm. Capillary refill takes less than 2 seconds. No erythema.     Neurological Exam:   LOC: alert  Attention Span: Good   Language: No aphasia  Articulation: No dysarthria  Orientation: Person, Place, Time   Visual Fields: Full  EOM (CN III, IV, VI): Full/intact  Pupils (CN II, III): PERRL  Facial Sensation (CN V): Normal  Facial Movement (CN VII): Symmetric facial expression    Reflexes: 2+ throughout  Cebellar: No evidence of appendicular or axial ataxia    Laboratory:  CMP: No results for input(s): GLUCOSE, CALCIUM, ALBUMIN, PROT, NA, K, CO2, CL, BUN, CREATININE, ALKPHOS, ALT, AST, BILITOT in the last 24 hours.  CBC:   Recent Labs   Lab  09/20/18   0404   WBC  8.91   RBC  4.75   HGB  14.5   HCT  45.0   PLT  330   MCV  95   MCH  30.5   MCHC  32.2

## 2018-09-21 NOTE — PLAN OF CARE
Problem: Patient Care Overview  Goal: Plan of Care Review  Outcome: Ongoing (interventions implemented as appropriate)  Pt free of falls and injury during shift. POC reviewed with pt. Educated pt on why they are at a risk for falls. Applied non slip socks and side rails x2, educated pt to use call light for assistance with ambulation. Educated pt and family pt was to lay flat for 2 hours post cath and was to remain on bedrest for 4 hours post cath, pt compliant. Pt was able to tolerate liquids and progressed to regular diet. VS per order set stable, +2 radial and pedal pulses, groin site absent of hematoma, pain, or blood. Scant amount of dried blood noted at assessment when pt was transferred back to CSU. Neuro checks WDL.  SR/SB on telemetry. POC BG 99. No acute events noted at this time. No complaints. Bed low and locked, call light with in reach. Family at bedside. Will continue to monitor.

## 2018-09-21 NOTE — PROGRESS NOTES
Pt returned back to room 329 from cath procedure. VS stable, groin site WDL, Neuro checks WDL. Educated pt and his family pt is to remain flat for 2 hours and on bedrest for 4 hours post procedure. Will continue to monitor.

## 2018-09-21 NOTE — PT/OT/SLP PROGRESS
Physical Therapy Treatment/DC Summary    Patient Name:  Flakito Felder   MRN:  [id number]    Recommendations:     Discharge Recommendations:  outpatient speech therapy   Discharge Equipment Recommendations: none   Barriers to discharge: None    Assessment:     Flakito Felder is a 43 y.o. male admitted with a medical diagnosis of Cerebrovascular accident (CVA) due to embolism of left middle cerebral artery. Patient presents w/ pain in his R anterior groin, which limits his Hip flexion during swing phase. Patient able to ambulate w/ no balance impairments, no increase in pain, and good R foot clearance during gait. Patient safe to DC home w/ no PT followup.    Recent Surgery: Procedure(s) (LRB):  ECHOCARDIOGRAM,TRANSESOPHAGEAL (N/A) 2 Days Post-Op    Plan:     During this hospitalization, patient to be seen 4 x/week to address the above listed problems via gait training, neuromuscular re-education, therapeutic activities, therapeutic exercises  · Plan of Care Expires:  10/15/18   Plan of Care Reviewed with: patient    Subjective     Communicated with nurse prior to session.  Patient found supine w/ hob elevated upon PT entry to room, agreeable to treatment.      Chief Complaint: R anterior groin pain, impaired cardiovascular response to physical activity  Pain/Comfort:  · Pain Rating 1: ( slight pain in R groin from incision, not quantified)  · Location - Side 1: (anterior)  · Location - Orientation 1: (groin)  · Pain Addressed 1: Distraction    Patients cultural, spiritual, Judaism conflicts given the current situation: n/a    Objective:     Patient found with: peripheral IV, telemetry     General Precautions: Standard, aspiration, fall   Orthopedic Precautions:N/A   Braces: N/A     Functional Mobility:  · Bed Mobility:  Supine to Sit: independence  · Transfers:  Sit to Stand:  independence with no AD  · Gait: 200' no AD, Independent  · Decreased R Hip flexion during swing phase, Total Foot clearance  bilaterally  · Balance: Patient demonstrates 360 degree turns, varying gait speeds, picking object up from ground, and tandem walking w/ only one self corrected LOB noted during tandem gait      AM-PAC 6 CLICK MOBILITY  Turning over in bed (including adjusting bedclothes, sheets and blankets)?: 4  Sitting down on and standing up from a chair with arms (e.g., wheelchair, bedside commode, etc.): 4  Moving from lying on back to sitting on the side of the bed?: 4  Moving to and from a bed to a chair (including a wheelchair)?: 4  Need to walk in hospital room?: 4  Climbing 3-5 steps with a railing?: 4  Basic Mobility Total Score: 24       Therapeutic Activities and Exercises:   Patient assisted w/ hallway ambulation and balance assessment    Patient left sitting EOB with all lines intact, call button in reach, nurse notified and family present..    GOALS:   Multidisciplinary Problems     Physical Therapy Goals     Not on file          Multidisciplinary Problems (Resolved)        Problem: Physical Therapy Goal    Goal Priority Disciplines Outcome Goal Variances Interventions   Physical Therapy Goal   (Resolved)     PT, PT/OT Outcome(s) achieved     Description:  Goals to be met by: 9/28/2018    Patient will increase functional independence with mobility by performing:    Supine <> sit with Supervision.- MET  Sit <> stand transfer with Supervision using No Assistive Device. - MET  Bed <> chair transfer via Stand Pivot with Supervision using No Assistive Device. - MET  Gait  x 150 feet with Supervision using No Assistive Device to prepare for community ambulation and endurance activities. - MET  - Gait x 300 feet with independence and good R foot clearance to improve safety with community mobility.   Dynamic standing for 10 minutes with Stand-by Assistance using No Assistive Device to prepare for functional tasks in standing.  Pt will be compliant with RLE DF exercises 30x/ea to improve endurance and RLE clearance during  gait. (instructed 9/19)                         Time Tracking:     PT Received On: 09/21/18  PT Start Time: 0924     PT Stop Time: 0932  PT Total Time (min): 8 min     Billable Minutes: Gait Training 8 Min    Treatment Type: Treatment  PT/PTA: PT           George Livingston, PT  09/21/2018

## 2018-09-21 NOTE — ASSESSMENT & PLAN NOTE
44 yo male with HTN and smoker presented with right sided weakness found to have L MCA occlusion s/p tPA and IR thrombectomy. Currently has no neurological deficits. 2D echo with bubble demonstrated right to left shunt concerning for ASD vs PFO. IR consult for evaluation of closure.      Antithrombotics for secondary stroke prevention: Antiplatelets: Aspirin: 81 mg and Plavix for 6 months per interventional cardiology recommendation. S/p PFO closure as evident on HILDA which is likely etiology of stroke.     Statins for secondary stroke prevention and hyperlipidemia, if present:   Statins: Atorvastatin d/c'd LDL wnl and now we now etiology of stroke (paradoxical cardio-embolic stroke)    Aggressive risk factor modification: HTN, Smoking. Currently on Lisinopril. No previous hx of HTN. Needs re-evaluation for HTN post discharge to see if he needs to be on BP meds.      Rehab efforts: PT/OT- discharged with OP PT/OT/speech     Diagnostics ordered/pending: CT scan of head without contrast to asses brain parenchyma, MRA head to assess vasculature, MRA neck/arch to assess vasculature, TTE to assess cardiac function/status     VTE prophylaxis: Lovenox 40 mg daily     BP parameters: Infarct: Post sucessful thrombectomy, SBP <140     HILDA with evidence of PFO. Closed. DAPT for 6 months. Follow up with Dr. Santos in clinic (interventional cardiology). Unable to provide event monitor per recommendation as he has no insurance.     Hypercoagulable labs unremarkable so far.

## 2018-09-21 NOTE — PLAN OF CARE
Problem: Physical Therapy Goal  Goal: Physical Therapy Goal  Goals to be met by: 9/28/2018    Patient will increase functional independence with mobility by performing:    Supine <> sit with Supervision.- MET  Sit <> stand transfer with Supervision using No Assistive Device. - MET  Bed <> chair transfer via Stand Pivot with Supervision using No Assistive Device. - MET  Gait  x 150 feet with Supervision using No Assistive Device to prepare for community ambulation and endurance activities. - MET  - Gait x 300 feet with independence and good R foot clearance to improve safety with community mobility.   Dynamic standing for 10 minutes with Stand-by Assistance using No Assistive Device to prepare for functional tasks in standing.  Pt will be compliant with RLE DF exercises 30x/ea to improve endurance and RLE clearance during gait. (instructed 9/19)        Outcome: Outcome(s) achieved Date Met: 09/21/18  Patient has not formally met all goals, but demonstrates safety in mobility and understanding by expected afternoon DC.    George Livingston, PT, DPT  9/21/2018  Pager [phone]

## 2018-09-21 NOTE — PLAN OF CARE
New RX sent to o/p pharmacy for Plavix. Cost transfer form has been faxed to pharmacy for $4.18 and medication will be delivered to bedside. Updated nurse.

## 2018-09-21 NOTE — DISCHARGE SUMMARY
"[facility]  Vascular Neurology  Comprehensive Stroke Center  Discharge Summary     Summary:     Admit Date: 9/16/2018  4:07 AM    Discharge Date and Time: 9/21/2018  1:46 PM    Attending Physician:  Donte Anderson MD    Discharge Provider: Roosevelt Contreras MD    History of Present Illness: Patient is a 43 y.o. male with no significant past medical history presented to hospital as a transfer from [facility] for evaluation of L MCA stroke.  HPI information obtained via review of the patient's record due to his condition, no family at the bedside.  Patient was LKN ~2300.  The patient had been out with friends drinking and returned home ~2200 and was talking with his girlfriend and began to act "strange" after speaking with her for 10-15 min.  He bent over and was off balance which his girlfriend attributed to his ETOH consumption.  The patient went to bed ~2300 and woke up ~2330 attempting to get out of bed but had acutely developed aphasia and right hemiparesis. The patient was taken to the OSH ED where a CTH was obtained and was negative for acute findings.  tPA was administered.  Telestroke consult performed by Dr. Mix.  Transferred to [facility] for further evaluation/possible intervention.  On arrival patient is aphasic, dysarthric, appears to be having difficulty with his oral secretions, and has Right sided weakness.  Taken to CT for CTH.  No evidence of hemorrhage.  Patient to IR for cerebral angio, possible thrombectomy.  Will be admitted to [facility] for close monitoring post procedure and tPA.  Vascular Neurology will continue to follow.      Hospital Course (synopsis of major diagnoses, care, treatment, and services provided during the course of the hospital stay): Patient came in with right sided weakness/aphasia MRI brain showing "Mild moderate sized left MCA distribution acute/recent infarction primarily within the left basal ganglia. Punctate probable embolic " "infarction within the right posterior frontal subcortical white matter." no vascular stenosis on CTA head & Neck.Patient is s/p tPA administration from OSH ([facility]) and IR thrombectomy here at [facility], currently with no neurological deficits up on discharge. HILDA with PFO. Closed by Interventional cardiology. Started on ASA/Plavix for 6 months. Cardiac event monitored ordered but unlikely to get due to pt not having insurance. Needs to follow up with Dr. Santos (Interventional cardiology) in 4 weeks. Needs insurance for follow up visit. Discussed with pt. Per family, they're working on acquiring one. Social work unable to get one. Pt lives in [place] and doesn't qualify for medicaid here.     STROKE DOCUMENTATION   Acute Stroke Times   Last Known Normal Date: 09/15/18  Last Known Normal Time: 2300  Symptom Onset Date: 09/15/18  Symptom Onset Time: 2330  Stroke Team Called Date: 09/16/18  Stroke Team Called Time: 0125  Stroke Team Arrival Date: 09/16/18  Stroke Team Arrival Time: 0130  CT Interpretation Time: 0039(no acute abnormality)  Decision to Treat Time for Alteplase: 0040(administered prior to telestroke consult)  Decision to Treat Time for IR: 0414(taken to IR for possible thrombectomy)     NIH Scale:  1a. Level Of Consciousness: 0-->Alert: keenly responsive  1b. LOC Questions: 0-->Answers both questions correctly  1c. LOC Commands: 0-->Performs both tasks correctly  2. Best Gaze: 0-->Normal  3. Visual: 0-->No visual loss  4. Facial Palsy: 0-->Normal symmetrical movements  5a. Motor Arm, Left: 0-->No drift: limb holds 90 (or 45) degrees for full 10 secs  5b. Motor Arm, Right: 0-->No drift: limb holds 90 (or 45) degrees for full 10 secs  6a. Motor Leg, Left: 0-->No drift: leg holds 30 degree position for full 5 secs  6b. Motor Leg, Right: 0-->No drift: leg holds 30 degree position for full 5 secs  7. Limb Ataxia: 0-->Absent  8. Sensory: 0-->Normal: no sensory loss  9. Best Language: 0-->No " aphasia: normal  10. Dysarthria: 0-->Normal  11. Extinction and Inattention (formerly Neglect): 0-->No abnormality  Total (NIH Stroke Scale): 0        Modified Bob Score: 0  Seward Coma Scale:    ABCD2 Score:    [id number] Score:   HAS -BLED Score:   ICH Score:   Hunt & Hernandez Classification:       Assessment/Plan:     Interventions: IV t-PA, Thrombectomy    Complications: None    Disposition: Home or Self Care    Final Active Diagnoses:    Diagnosis Date Noted POA    PRINCIPAL PROBLEM:  Cerebrovascular accident (CVA) due to embolism of left middle cerebral artery [I63.412] 09/16/2018 Yes    Interatrial cardiac shunt [Q24.8] 09/18/2018 Not Applicable    Mild tetrahydrocannabinol (THC) abuse [F12.10] 09/17/2018 Yes    Cytotoxic cerebral edema [G93.6] 09/16/2018 Yes    Essential hypertension [I10] 09/16/2018 Yes      Problems Resolved During this Admission:    Diagnosis Date Noted Date Resolved POA    S/P admn tPA in diff fac w/n last 24 hr bef adm to crnt fac [Z92.82] 09/16/2018 09/20/2018 Not Applicable     * Cerebrovascular accident (CVA) due to embolism of left middle cerebral artery    44 yo male with HTN and smoker presented with right sided weakness found to have L MCA occlusion s/p tPA and IR thrombectomy. Currently has no neurological deficits. 2D echo with bubble demonstrated right to left shunt concerning for ASD vs PFO. IR consult for evaluation of closure.      Antithrombotics for secondary stroke prevention: Antiplatelets: Aspirin: 81 mg and Plavix for 6 months per interventional cardiology recommendation. S/p PFO closure as evident on HILDA which is likely etiology of stroke.     Statins for secondary stroke prevention and hyperlipidemia, if present:   Statins: Atorvastatin d/c'd LDL wnl and now we now etiology of stroke (paradoxical cardio-embolic stroke)    Aggressive risk factor modification: HTN, Smoking. Currently on Lisinopril. No previous hx of HTN. Needs re-evaluation for HTN post  discharge to see if he needs to be on BP meds.      Rehab efforts: PT/OT- discharged with OP PT/OT/speech     Diagnostics ordered/pending: CT scan of head without contrast to asses brain parenchyma, MRA head to assess vasculature, MRA neck/arch to assess vasculature, TTE to assess cardiac function/status     VTE prophylaxis: Lovenox 40 mg daily     BP parameters: Infarct: Post sucessful thrombectomy, SBP <140     HILDA with evidence of PFO. Closed. DAPT for 6 months. Follow up with Dr. Santos in clinic (interventional cardiology). Unable to provide event monitor per recommendation as he has no insurance.     Hypercoagulable labs unremarkable so far.       Essential hypertension    -Stroke risk factor.  Patient without previous diagnosis or treatment.  Initial SBP at .  SBP on arrival to this facility 170s.  -SBP<140  -Started on Lisinopril 10 mg, daily  -BP has been optimal during hospitalization  -Need re-assessment if he actually needs BP meds post discharge. Hopefully he follows up with PCP. Pt with out insurance.       Cytotoxic cerebral edema    -Area of cytotoxic cerebral edema identified when reviewing brain imaging in the territory of the left middle cerebral artery. There is no mass effect associated with it. We will continue to monitor the patients clinical exam for any worsening of symptoms which may indicate expansion of the stroke or the area of the edema resulting in the clinical change. The pattern is suggestive of embolic etiology          Recommendations:     Post-discharge complication risks: None    Stroke Education given to: patient and family    Follow-up in Stroke Clinic in 4-6 weeks.     Discharge Plan:  Antithrombotics: Aspirin 81 mg, Clopidogrel 75 mg    Follow Up:  Follow-up Information     [facility] On 10/2/2018.    Why:  Hospital Follow-up/ Appt at 1:45pm. Please bring discharge paperwork, medications and income information. Patient will need a referral for a  cardiologist in [facility].  Contact information:  [address]  [place] [address]  [phone]             [facility].    Specialty:  Neurology  Why:  Office will contact you to set up an appointment within 4-6 weeks. If you do not hear from the office by 9/25/18, please call to schedule an appointment.   Contact information:  [address]  [facility] [address] [phone]  Additional information:  7th Floor           Rick Santos MD In 4 weeks.    Specialties:  Cardiology, INTERVENTIONAL CARDIOLOGY  Why:  Once insurance has been obtained, call to make a follow-up appointment within 4-6 weeks.   Contact information:  [address]  [facility] [address]  [phone]                   Patient Instructions:      Ambulatory Referral to Speech Therapy   Referral Priority: Routine Referral Type: Speech Therapy   Referral Reason: Specialty Services Required   Requested Specialty: Speech Pathology   Number of Visits Requested: 1     Ambulatory consult to Physical Therapy   Referral Priority: Routine Referral Type: Physical Medicine   Referral Reason: Specialty Services Required   Requested Specialty: Physical Therapy   Number of Visits Requested: 1     Ambulatory Referral to Speech Therapy   Referral Priority: Routine Referral Type: Speech Therapy   Referral Reason: Specialty Services Required   Requested Specialty: Speech Pathology   Number of Visits Requested: 1     Diet Adult Regular     Notify your health care provider if you experience any of the following:  severe persistent headache     Notify your health care provider if you experience any of the following:  persistent dizziness, light-headedness, or visual disturbances     Notify your health care provider if you experience any of the following:  increased confusion or weakness     PT evaluation   Standing Status: Future Standing Exp. Date: 09/21/19     Cardiac event monitor   Standing Status: Future  Standing Exp. Date: 09/21/19   Scheduling Instructions: 30 day cardiac event monitor     Order Specific Question Answer Comments   Cardiac Event Monitor Looping Recorder      Activity as tolerated     Activity as tolerated       Medications:  Reconciled Home Medications:      Medication List      START taking these medications    aspirin 81 MG Chew  Take 1 tablet (81 mg total) by mouth once daily.     clopidogrel 75 mg tablet  Commonly known as:  PLAVIX  Take 1 tablet (75 mg total) by mouth once daily.     lisinopril 10 MG tablet  Take 1 tablet (10 mg total) by mouth once daily.            Roosevelt Contreras MD  Comprehensive Stroke Center  Department of Vascular Neurology   [facility]

## 2018-09-25 ENCOUNTER — TELEPHONE (OUTPATIENT)
Dept: CARDIOLOGY | Facility: CLINIC | Age: 43
End: 2018-09-25

## 2018-09-25 ENCOUNTER — NURSE TRIAGE (OUTPATIENT)
Dept: ADMINISTRATIVE | Facility: CLINIC | Age: 43
End: 2018-09-25

## 2018-09-25 NOTE — TELEPHONE ENCOUNTER
Reason for Disposition   Nursing judgment    Protocols used: ST NO PROTOCOL AVAILABLE - INFORMATION ONLY-A-[place]    Spoke to Mr. Felder's friend Paz. She states he has a knot to his right groin about the size of a quarter. She states the area is painful. Patient is s/p a cardiac cath on 9/20/18. Patient was not with caller.

## 2018-09-25 NOTE — TELEPHONE ENCOUNTER
Patient underwent PFO closure. Patient's friend called stating that he was having some right groin pain earlier and that there is a knot in the groin area above the site. Patient's friend states he is not having any pain now. I instructed her to watch the site for any growth of the knot and to watch for signs of infection like fever, redness,and/or oozing. I instructed her to call us back for any further problems. Dr. Santos notified.

## 2018-10-23 ENCOUNTER — PATIENT MESSAGE (OUTPATIENT)
Dept: CARDIOLOGY | Facility: CLINIC | Age: 43
End: 2018-10-23

## 2018-11-07 ENCOUNTER — PATIENT MESSAGE (OUTPATIENT)
Dept: CARDIOLOGY | Facility: CLINIC | Age: 43
End: 2018-11-07

## 2018-11-07 ENCOUNTER — TELEPHONE (OUTPATIENT)
Dept: CARDIOLOGY | Facility: CLINIC | Age: 43
End: 2018-11-07

## 2018-11-07 NOTE — TELEPHONE ENCOUNTER
Patient sent request for a f/u appointment with Dr. Santos. I have tried to call his number which has been disconnected. I have left 3 messages on the other phone number listed in the computer and I sent a [facility] message to the patient about calling me to discuss an appointment.

## 2019-01-19 RX ORDER — LISINOPRIL 10 MG/1
10 TABLET ORAL DAILY
Qty: 90 TABLET | Refills: 3 | OUTPATIENT
Start: 2019-01-19

## 2019-01-19 RX ORDER — CLOPIDOGREL BISULFATE 75 MG/1
75 TABLET ORAL DAILY
Qty: 180 TABLET | Refills: 0 | OUTPATIENT
Start: 2019-01-19

## 2019-02-19 PROBLEM — I66.9 CEREBRAL EMBOLISM: Status: ACTIVE | Noted: 2019-02-19

## 2019-02-19 NOTE — CONSULTS
[facility]  Vascular Neurology  Comprehensive Stroke Center  Tele Stroke Consult Note    Consults  Assessment/Plan:     Patient is a 43 y.o. 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  Recommended transfer to [facility] for MP-CTA head and neck to determine whether or no he is a suitable endovascular candidate.        STROKE DOCUMENTATION     Acute Stroke Times   Last Known Normal Date: 09/15/18  Last Known Normal Time: 2300  Symptom Onset Date: 09/15/18  Symptom Onset Time: 1325  Stroke Team Called Date: 09/16/18  Stroke Team Called Time: 1325  Stroke Team Arrival Date: 09/16/18  Stroke Team Arrival Time: 1330  CT Interpretation Time: (CT unavailable for review)  Decision to Treat Time for Alteplase: (iv alteplase was already administeered by the time I saw the patient)  Decision to Treat Time for IR: 1348    NIH Scale:  Interval: baseline  1a. Level of Consciousness: 1-->Not alert, but arousable by minor stimulation to obey, answer, or respond  1b. LOC Questions: 2-->Answers neither question correctly  1c. LOC Commands: 2-->Performs neither task correctly  2. Best Gaze: 0-->Normal  3. Visual: 0-->No visual loss  4. Facial Palsy: 1-->Minor paralysis (flattened nasolabial fold, asymmetry on smiling)  5a. Motor Arm, Left: 0-->No drift, limb holds 90 (or 45) degrees for full 10 secs  5b. Motor Arm, Right: 2-->Some effort against gravity, limb cannot get to or maintain (if cued) 90 (or 45) degrees, drifts down to bed, but has some effort against gravity  6a. Motor Leg, Left: 0-->No drift, leg holds 30 degree position for full 5 secs  6b. Motor Leg, Right: 2-->Some effort against gravity, leg falls to bed by 5 secs, but has some effort against gravity  7. Limb Ataxia: 0-->Absent  8. Sensory: 0-->Normal, no sensory loss  9. Best Language: 2-->Severe aphasia, all communication is through fragmentary expression, great need for inference, questioning, and guessing  "by the listener. Range of information that can be exchanged is limited, listener carries burden of. . . (see row details)  10. Dysarthria: 0-->Normal  11. Extinction and Inattention (formerly Neglect): 2-->Profound payal-inattention/extinction more than 1 modality  Total (NIH Stroke Scale): 14    Modified [place] Score: 0  Fabio Coma Scale:5   ABCD2 Score:    [id number] Score:   HAS -BLED Score:   ICH Score:   Hunt & Hernandez Classification:       Thrombolysis Candidate? Yes, given prior to arrival at outside hospital      Interventional Revascularization Candidate?   Is the patient eligible for mechanical endovascular reperfusion (SAI)?  Yes      Hemorrhagic change of an Ischemic Stroke: Does this patient have an ischemic stroke with hemorrhagic changes? No     Subjective:     History of Present Illness:  43 y.o. male without known past medical history, smoker, who reportedly had been out with friends drinking and returned home ~2200 and was talking with his girlfriend and began to act "strange" after speaking with her for 10-15 min, and subsequently was noted to have R sided weakness.           Past Medical History:   Diagnosis Date    Cerebrovascular accident (CVA) due to embolism of left middle cerebral artery 9/16/2018    Essential hypertension 9/16/2018     Past Surgical History:   Procedure Laterality Date    ECHOCARDIOGRAM,TRANSESOPHAGEAL N/A 9/19/2018    Performed by [facility] Diagnostic Provider at [facility] CATH LAB    REPAIR, PATENT FORAMEN OVALE Right 9/20/2018    Performed by Rick Santos MD at [facility] CATH LAB     History reviewed. No pertinent family history.  Social History     Tobacco Use    Smoking status: Unknown If Ever Smoked    Smokeless tobacco: Never Used   Substance Use Topics    Alcohol use: Not on file    Drug use: Not on file     Review of patient's allergies indicates:  No Known Allergies    Medications: I have reviewed the current medication administration record.    No medications " prior to admission.       Review of Systems   Unable to perform ROS: Patient nonverbal     Objective:     Vital Signs (Most Recent):  Temp: 98.4 °F (36.9 °C) (09/21/18 1155)  Pulse: 73 (09/21/18 1155)  Resp: 18 (09/21/18 1155)  BP: 122/66 (09/21/18 1155)  SpO2: 96 % (09/21/18 1155)    Vital Signs Range (Last 24H):       Physical Exam   Constitutional: He appears well-developed and well-nourished.   HENT:   Head: Normocephalic and atraumatic.   Eyes: Pupils are equal, round, and reactive to light. Right eye exhibits no discharge. Left eye exhibits no discharge.   Neck: Normal range of motion. Neck supple.   Cardiovascular: Normal rate and regular rhythm.   Pulmonary/Chest: No respiratory distress.   Abdominal: He exhibits no mass.   Genitourinary:   Genitourinary Comments: No performed   Musculoskeletal: He exhibits no edema or deformity.   Neurological: A cranial nerve deficit and sensory deficit is present.   Global aphasia   Skin: No rash noted. He is not diaphoretic. No erythema.   Psychiatric:   No tested, global aphasia   Nursing note and vitals reviewed.      Neurological Exam:   LOC: alert and obtunded  Attention Span: poor  Language: Global aphasia  Articulation: Untestable due to severe aphasia   Orientation: Person, Place, Time , Untestable due to severe aphasia   Visual Fields: Full  EOM (CN III, IV, VI): L gaze preference  Pupils (CN II, III): PERRL  Facial Sensation (CN V): Normal  Facial Movement (CN VII): Lower facial weakness on the Right  Gag Reflex: present  Reflexes: 2+ throughout  Motor: Arm left  Paresis: 4/5  Leg left  Normal 5/5  Arm right  Paresis: 3/5  Leg right Paresis: 3/5  Cebellar: unable to test  Sensation: Deon-hypoesthesia right  Tone: Flaccid  RUE and RLE       Laboratory:  CMP: No results for input(s): GLUCOSE, CALCIUM, ALBUMIN, PROT, NA, K, CO2, CL, BUN, CREATININE, ALKPHOS, ALT, AST, BILITOT in the last 24 hours.  CBC: No results for input(s): WBC, RBC, HGB, HCT, PLT, MCV, MCH,  MCHC in the last 168 hours.  Lipid Panel: No results for input(s): CHOL, LDLCALC, HDL, TRIG in the last 168 hours.  Coagulation: No results for input(s): PT, INR, APTT in the last 168 hours.  Hgb A1C: No results for input(s): HGBA1C in the last 168 hours.  TSH: No results for input(s): TSH in the last 168 hours.    Diagnostic Results:      Brain imaging:  CT head: no acute abnormality.    Vessel Imaging:  None     Cardiac Evaluation:   NSR      Ron Mix MD  [facility] Stroke Center  Department of Vascular Neurology   [facility]

## 2019-02-19 NOTE — SUBJECTIVE & OBJECTIVE
Past Medical History:   Diagnosis Date    Cerebrovascular accident (CVA) due to embolism of left middle cerebral artery 9/16/2018    Essential hypertension 9/16/2018     Past Surgical History:   Procedure Laterality Date    ECHOCARDIOGRAM,TRANSESOPHAGEAL N/A 9/19/2018    Performed by [facility] Diagnostic Provider at [facility] CATH LAB    REPAIR, PATENT FORAMEN OVALE Right 9/20/2018    Performed by Rick Santos MD at [facility] CATH LAB     History reviewed. No pertinent family history.  Social History     Tobacco Use    Smoking status: Unknown If Ever Smoked    Smokeless tobacco: Never Used   Substance Use Topics    Alcohol use: Not on file    Drug use: Not on file     Review of patient's allergies indicates:  No Known Allergies    Medications: I have reviewed the current medication administration record.    No medications prior to admission.       Review of Systems   Unable to perform ROS: Patient nonverbal     Objective:     Vital Signs (Most Recent):  Temp: 98.4 °F (36.9 °C) (09/21/18 1155)  Pulse: 73 (09/21/18 1155)  Resp: 18 (09/21/18 1155)  BP: 122/66 (09/21/18 1155)  SpO2: 96 % (09/21/18 1155)    Vital Signs Range (Last 24H):       Physical Exam   Constitutional: He appears well-developed and well-nourished.   HENT:   Head: Normocephalic and atraumatic.   Eyes: Pupils are equal, round, and reactive to light. Right eye exhibits no discharge. Left eye exhibits no discharge.   Neck: Normal range of motion. Neck supple.   Cardiovascular: Normal rate and regular rhythm.   Pulmonary/Chest: No respiratory distress.   Abdominal: He exhibits no mass.   Genitourinary:   Genitourinary Comments: No performed   Musculoskeletal: He exhibits no edema or deformity.   Neurological: A cranial nerve deficit and sensory deficit is present.   Global aphasia   Skin: No rash noted. He is not diaphoretic. No erythema.   Psychiatric:   No tested, global aphasia   Nursing note and vitals reviewed.      Neurological Exam:   LOC:  alert and obtunded  Attention Span: poor  Language: Global aphasia  Articulation: Untestable due to severe aphasia   Orientation: Person, Place, Time , Untestable due to severe aphasia   Visual Fields: Full  EOM (CN III, IV, VI): L gaze preference  Pupils (CN II, III): PERRL  Facial Sensation (CN V): Normal  Facial Movement (CN VII): Lower facial weakness on the Right  Gag Reflex: present  Reflexes: 2+ throughout  Motor: Arm left  Paresis: 4/5  Leg left  Normal 5/5  Arm right  Paresis: 3/5  Leg right Paresis: 3/5  Cebellar: unable to test  Sensation: Deon-hypoesthesia right  Tone: Flaccid  RUE and RLE       Laboratory:  CMP: No results for input(s): GLUCOSE, CALCIUM, ALBUMIN, PROT, NA, K, CO2, CL, BUN, CREATININE, ALKPHOS, ALT, AST, BILITOT in the last 24 hours.  CBC: No results for input(s): WBC, RBC, HGB, HCT, PLT, MCV, MCH, MCHC in the last 168 hours.  Lipid Panel: No results for input(s): CHOL, LDLCALC, HDL, TRIG in the last 168 hours.  Coagulation: No results for input(s): PT, INR, APTT in the last 168 hours.  Hgb A1C: No results for input(s): HGBA1C in the last 168 hours.  TSH: No results for input(s): TSH in the last 168 hours.    Diagnostic Results:      Brain imaging:  CT head: no acute abnormality.    Vessel Imaging:  None     Cardiac Evaluation:   NSR

## 2019-02-19 NOTE — ASSESSMENT & PLAN NOTE
44 y/o with L MCA syndrome, likely embolic, who just received iv alteplase and will be transfer to [facility] main campus for multiphase CTA head and neck to determine whether or no endovascular intervention is required.